# Patient Record
Sex: MALE | Race: WHITE | Employment: OTHER | URBAN - METROPOLITAN AREA
[De-identification: names, ages, dates, MRNs, and addresses within clinical notes are randomized per-mention and may not be internally consistent; named-entity substitution may affect disease eponyms.]

---

## 2017-01-20 DIAGNOSIS — I25.10 CORONARY ARTERY DISEASE INVOLVING NATIVE CORONARY ARTERY OF NATIVE HEART WITHOUT ANGINA PECTORIS: ICD-10-CM

## 2017-01-20 DIAGNOSIS — I10 ESSENTIAL HYPERTENSION: ICD-10-CM

## 2017-01-20 LAB
CHOLESTEROL, TOTAL: 144 MG/DL (ref 0–199)
HDLC SERPL-MCNC: 46 MG/DL (ref 40–59)
LDL CHOLESTEROL CALCULATED: 79 MG/DL (ref 0–129)
TRIGL SERPL-MCNC: 97 MG/DL (ref 0–200)

## 2017-01-27 ENCOUNTER — OFFICE VISIT (OUTPATIENT)
Dept: FAMILY MEDICINE CLINIC | Age: 82
End: 2017-01-27

## 2017-01-27 VITALS
HEIGHT: 66 IN | BODY MASS INDEX: 23.19 KG/M2 | WEIGHT: 144.3 LBS | TEMPERATURE: 97.5 F | SYSTOLIC BLOOD PRESSURE: 110 MMHG | DIASTOLIC BLOOD PRESSURE: 68 MMHG | RESPIRATION RATE: 16 BRPM | HEART RATE: 63 BPM

## 2017-01-27 DIAGNOSIS — G20 PARKINSON DISEASE (HCC): ICD-10-CM

## 2017-01-27 DIAGNOSIS — E78.2 MIXED HYPERLIPIDEMIA: ICD-10-CM

## 2017-01-27 DIAGNOSIS — D68.52 PROTHROMBIN G20210A MUTATION (HCC): ICD-10-CM

## 2017-01-27 DIAGNOSIS — G56.03 BILATERAL CARPAL TUNNEL SYNDROME: ICD-10-CM

## 2017-01-27 DIAGNOSIS — M1A.09X0 IDIOPATHIC CHRONIC GOUT OF MULTIPLE SITES WITHOUT TOPHUS: Primary | ICD-10-CM

## 2017-01-27 DIAGNOSIS — K59.09 OTHER CONSTIPATION: ICD-10-CM

## 2017-01-27 DIAGNOSIS — I10 ESSENTIAL HYPERTENSION: ICD-10-CM

## 2017-01-27 DIAGNOSIS — I25.10 CORONARY ARTERY DISEASE INVOLVING NATIVE CORONARY ARTERY OF NATIVE HEART WITHOUT ANGINA PECTORIS: ICD-10-CM

## 2017-01-27 PROCEDURE — G8510 SCR DEP NEG, NO PLAN REQD: HCPCS | Performed by: FAMILY MEDICINE

## 2017-01-27 PROCEDURE — 99214 OFFICE O/P EST MOD 30 MIN: CPT | Performed by: FAMILY MEDICINE

## 2017-01-27 PROCEDURE — 3288F FALL RISK ASSESSMENT DOCD: CPT | Performed by: FAMILY MEDICINE

## 2017-01-27 ASSESSMENT — PATIENT HEALTH QUESTIONNAIRE - PHQ9
SUM OF ALL RESPONSES TO PHQ9 QUESTIONS 1 & 2: 0
2. FEELING DOWN, DEPRESSED OR HOPELESS: 0
1. LITTLE INTEREST OR PLEASURE IN DOING THINGS: 0
SUM OF ALL RESPONSES TO PHQ QUESTIONS 1-9: 0

## 2017-05-05 ENCOUNTER — TELEPHONE (OUTPATIENT)
Dept: FAMILY MEDICINE CLINIC | Age: 82
End: 2017-05-05

## 2017-05-18 ENCOUNTER — OFFICE VISIT (OUTPATIENT)
Dept: FAMILY MEDICINE CLINIC | Age: 82
End: 2017-05-18

## 2017-05-18 VITALS
DIASTOLIC BLOOD PRESSURE: 64 MMHG | HEART RATE: 64 BPM | BODY MASS INDEX: 22.82 KG/M2 | TEMPERATURE: 97.3 F | RESPIRATION RATE: 14 BRPM | SYSTOLIC BLOOD PRESSURE: 102 MMHG | WEIGHT: 142 LBS | HEIGHT: 66 IN

## 2017-05-18 DIAGNOSIS — H60.502 ACUTE OTITIS EXTERNA OF LEFT EAR, UNSPECIFIED TYPE: ICD-10-CM

## 2017-05-18 DIAGNOSIS — I10 ESSENTIAL HYPERTENSION: ICD-10-CM

## 2017-05-18 DIAGNOSIS — G20 PARKINSON DISEASE (HCC): ICD-10-CM

## 2017-05-18 DIAGNOSIS — Z95.0 PACEMAKER: ICD-10-CM

## 2017-05-18 DIAGNOSIS — E78.2 MIXED HYPERLIPIDEMIA: ICD-10-CM

## 2017-05-18 DIAGNOSIS — I25.10 CORONARY ARTERY DISEASE INVOLVING NATIVE CORONARY ARTERY OF NATIVE HEART WITHOUT ANGINA PECTORIS: Primary | ICD-10-CM

## 2017-05-18 DIAGNOSIS — M1A.09X0 IDIOPATHIC CHRONIC GOUT OF MULTIPLE SITES WITHOUT TOPHUS: ICD-10-CM

## 2017-05-18 PROCEDURE — 99214 OFFICE O/P EST MOD 30 MIN: CPT | Performed by: FAMILY MEDICINE

## 2017-05-18 RX ORDER — CLOPIDOGREL BISULFATE 75 MG/1
TABLET ORAL
COMMUNITY
Start: 2017-05-08 | End: 2017-11-29 | Stop reason: SDUPTHER

## 2017-05-18 RX ORDER — NEOMYCIN SULFATE, POLYMYXIN B SULFATE AND HYDROCORTISONE 10; 3.5; 1 MG/ML; MG/ML; [USP'U]/ML
3 SUSPENSION/ DROPS AURICULAR (OTIC) 4 TIMES DAILY
Qty: 10 ML | Refills: 0 | Status: SHIPPED | OUTPATIENT
Start: 2017-05-18 | End: 2017-05-28

## 2017-07-18 RX ORDER — ALENDRONATE SODIUM 70 MG/1
TABLET ORAL
Qty: 12 TABLET | Refills: 3 | Status: SHIPPED | OUTPATIENT
Start: 2017-07-18 | End: 2017-11-29 | Stop reason: SDUPTHER

## 2017-07-28 RX ORDER — SIMVASTATIN 40 MG
TABLET ORAL
Qty: 90 TABLET | Refills: 0 | Status: SHIPPED | OUTPATIENT
Start: 2017-07-28 | End: 2017-10-21 | Stop reason: SDUPTHER

## 2017-08-18 ENCOUNTER — OFFICE VISIT (OUTPATIENT)
Dept: FAMILY MEDICINE CLINIC | Age: 82
End: 2017-08-18

## 2017-08-18 VITALS
WEIGHT: 131 LBS | TEMPERATURE: 97.9 F | DIASTOLIC BLOOD PRESSURE: 68 MMHG | HEART RATE: 70 BPM | RESPIRATION RATE: 16 BRPM | SYSTOLIC BLOOD PRESSURE: 116 MMHG | HEIGHT: 66 IN | BODY MASS INDEX: 21.05 KG/M2

## 2017-08-18 DIAGNOSIS — M1A.09X0 IDIOPATHIC CHRONIC GOUT OF MULTIPLE SITES WITHOUT TOPHUS: ICD-10-CM

## 2017-08-18 DIAGNOSIS — E78.2 MIXED HYPERLIPIDEMIA: ICD-10-CM

## 2017-08-18 DIAGNOSIS — K59.09 OTHER CONSTIPATION: ICD-10-CM

## 2017-08-18 DIAGNOSIS — Z23 NEED FOR INFLUENZA VACCINATION: ICD-10-CM

## 2017-08-18 DIAGNOSIS — I10 ESSENTIAL HYPERTENSION: ICD-10-CM

## 2017-08-18 DIAGNOSIS — I25.10 CORONARY ARTERY DISEASE INVOLVING NATIVE CORONARY ARTERY OF NATIVE HEART WITHOUT ANGINA PECTORIS: Primary | ICD-10-CM

## 2017-08-18 PROCEDURE — 99214 OFFICE O/P EST MOD 30 MIN: CPT | Performed by: FAMILY MEDICINE

## 2017-08-18 PROCEDURE — G0008 ADMIN INFLUENZA VIRUS VAC: HCPCS | Performed by: FAMILY MEDICINE

## 2017-08-18 PROCEDURE — 90662 IIV NO PRSV INCREASED AG IM: CPT | Performed by: FAMILY MEDICINE

## 2017-08-18 RX ORDER — AMANTADINE HYDROCHLORIDE 100 MG/1
1 TABLET ORAL 2 TIMES DAILY
COMMUNITY
Start: 2017-06-01 | End: 2017-11-29 | Stop reason: SDUPTHER

## 2017-10-23 RX ORDER — SIMVASTATIN 40 MG
TABLET ORAL
Qty: 90 TABLET | Refills: 0 | Status: SHIPPED | OUTPATIENT
Start: 2017-10-23 | End: 2017-11-29 | Stop reason: SDUPTHER

## 2017-11-07 ENCOUNTER — TELEPHONE (OUTPATIENT)
Dept: FAMILY MEDICINE CLINIC | Age: 82
End: 2017-11-07

## 2017-11-13 ENCOUNTER — HOSPITAL ENCOUNTER (OUTPATIENT)
Dept: GENERAL RADIOLOGY | Age: 82
Discharge: HOME | End: 2017-11-13
Payer: MEDICARE

## 2017-11-13 ENCOUNTER — OFFICE VISIT (OUTPATIENT)
Dept: FAMILY MEDICINE CLINIC | Age: 82
End: 2017-11-13

## 2017-11-13 VITALS
SYSTOLIC BLOOD PRESSURE: 110 MMHG | WEIGHT: 130 LBS | HEART RATE: 64 BPM | TEMPERATURE: 97.6 F | HEIGHT: 66 IN | OXYGEN SATURATION: 96 % | BODY MASS INDEX: 20.89 KG/M2 | RESPIRATION RATE: 16 BRPM | DIASTOLIC BLOOD PRESSURE: 66 MMHG

## 2017-11-13 DIAGNOSIS — M54.41 ACUTE BILATERAL LOW BACK PAIN WITH RIGHT-SIDED SCIATICA: Primary | ICD-10-CM

## 2017-11-13 DIAGNOSIS — I10 ESSENTIAL HYPERTENSION: ICD-10-CM

## 2017-11-13 DIAGNOSIS — M54.41 ACUTE BILATERAL LOW BACK PAIN WITH RIGHT-SIDED SCIATICA: ICD-10-CM

## 2017-11-13 DIAGNOSIS — M1A.09X0 IDIOPATHIC CHRONIC GOUT OF MULTIPLE SITES WITHOUT TOPHUS: ICD-10-CM

## 2017-11-13 DIAGNOSIS — G20 PARKINSON DISEASE (HCC): ICD-10-CM

## 2017-11-13 DIAGNOSIS — M25.551 HIP PAIN, RIGHT: ICD-10-CM

## 2017-11-13 DIAGNOSIS — I25.10 CORONARY ARTERY DISEASE INVOLVING NATIVE CORONARY ARTERY OF NATIVE HEART WITHOUT ANGINA PECTORIS: ICD-10-CM

## 2017-11-13 PROCEDURE — 72110 X-RAY EXAM L-2 SPINE 4/>VWS: CPT

## 2017-11-13 PROCEDURE — 73502 X-RAY EXAM HIP UNI 2-3 VIEWS: CPT

## 2017-11-13 PROCEDURE — 99214 OFFICE O/P EST MOD 30 MIN: CPT | Performed by: FAMILY MEDICINE

## 2017-11-13 RX ORDER — PREDNISONE 10 MG/1
TABLET ORAL
COMMUNITY
Start: 2017-11-10 | End: 2017-11-29 | Stop reason: SDUPTHER

## 2017-11-13 NOTE — PROGRESS NOTES
Chief Complaint   Patient presents with    Follow-Up from Hospital     rt leg pain    patient with pain in the low back and right hip  To emergency department  X-rays reported normal, also had CT brain because of fall and previous subdural  Weakness of the leg  Saw neurology  May have radiculopathy from some type of nerve compression and is scheduled for MRI  Here with daughter as well  Concern about possible undiscovered fracture  Date denies any other areas of pain at this time     parkinsons worse  Having falling  Will see physical therapy and may need home health care  Will keep neurology follow-up    Blood pressure has been stable  no cp/sob    Had ha and subdural  CT brain was normal in emergency department  Better now  Followed by neurosurgery  No new issues    kiran meds well    Hi chol watches diet    Treatment Adherence:   Medication compliance:  compliant all of the time  Diet compliance:  compliant all of the time  Weight trend: stable  Current exercise: aerobics 5 time(s) per week  Barriers: none    Hypertension:  Home blood pressure monitoring: No.  He is adherent to a low sodium diet. Patient denies chest pain, shortness of breath, headache, lightheadedness, blurred vision, peripheral edema, palpitations, dry cough and fatigue. Antihypertensive medication side effects: no medication side effects noted. Use of agents associated with hypertension: none. Sodium (mEq/L)   Date Value   07/20/2016 142    BUN (mg/dL)   Date Value   07/20/2016 17    Glucose (mg/dL)   Date Value   07/20/2016 112 (H)   02/21/2012 94      Potassium (mEq/L)   Date Value   07/20/2016 4.6    CREATININE (mg/dL)   Date Value   07/20/2016 0.81         Hyperlipidemia:  No new myalgias or GI upset on simvastatin (Zocor).      Lab Results   Component Value Date    CHOL 144 01/20/2017    TRIG 97 01/20/2017    HDL 46 01/20/2017    LDLCALC 79 01/20/2017     Lab Results   Component Value Date    ALT 12 Social History Main Topics    Smoking status: Never Smoker    Smokeless tobacco: Never Used    Alcohol use No    Drug use: Unknown    Sexual activity: Not on file     Other Topics Concern    Not on file     Social History Narrative    No narrative on file       No Known Allergies    Review of Systems - General ROS: negative  Psychological ROS: negative  ENT ROS: negative  Hematological and Lymphatic ROS: negative  Endocrine ROS: negative  Respiratory ROS: no cough, shortness of breath, or wheezing  Cardiovascular ROS: no chest pain or dyspnea on exertion  Gastrointestinal ROS: no abdominal pain, change in bowel habits, or black or bloody stools  Genito-Urinary ROS: no dysuria, trouble voiding, or hematuria  Musculoskeletal ROS: Pain  Neurological ROS: +tremor, no TIA or stroke symptoms  Dermatological ROS: neg    Blood pressure 110/66, pulse 64, temperature 97.6 °F (36.4 °C), temperature source Temporal, resp. rate 16, height 5' 6\" (1.676 m), weight 130 lb (59 kg), SpO2 96 %.     Physical Examination: General appearance - alert, well appearing, and in no distress  Mental status - alert, oriented to person, place, and time  Eyes - pupils equal and reactive, extraocular eye movements intact  Ears - bilateral TM's and external ear canals normal  Mouth - mucous membranes moist, pharynx normal without lesions  Neck - supple, no significant adenopathy  Lymphatics - no palpable lymphadenopathy, no hepatosplenomegaly  Chest - clear to auscultation, no wheezes, rales or rhonchi, symmetric air entry  Heart - normal rate, regular rhythm, normal S1, S2, systolic ejection murmur 3 of 6, otherwise no murmurs, rubs, clicks or gallops  Abdomen - soft, nontender, nondistended, no masses or organomegaly  Neurological - alert, oriented, normal speech, stable rue resting tremor, o/w no focal findings or movement disorder noted  Musculoskeletal - neg slr, nl n/v exam le bilat, no joint tenderness, deformity or swelling  Extremities - peripheral pulses normal, no pedal edema, no clubbing or cyanosis  Skin - normal coloration and turgor, no rashes, no suspicious skin lesions noted;  X-rays of the low back and right hip revealed DJD, no evidence of fracture at this time      Assessment:    1. Acute bilateral low back pain with right-sided sciatica  XR LUMBAR SPINE (MIN 4 VIEWS)    [place] [id number]    Internal Referral to Home Health   2. Hip pain, right  XR HIP RIGHT (2-3 VIEWS)    [place] [address]    Internal Referral to Home Health   3. Parkinson disease [facility])  [place] [address]    Internal Referral to [address] [place] [address]   4. Coronary artery disease involving native coronary artery of native heart without angina pectoris     5. Essential hypertension     6.  Idiopathic chronic gout of multiple sites without tophus         Plan:    Orders Placed This Encounter   Procedures    XR HIP RIGHT (2-3 VIEWS)     Standing Status:   Future     Number of Occurrences:   1     Standing Expiration Date:   11/13/2018    XR LUMBAR SPINE (MIN 4 VIEWS)     Standing Status:   Future     Number of Occurrences:   1     Standing Expiration Date:   11/13/2018   [facility]- [place]     Referral Priority:   Routine     Referral Type:   Eval and Treat     Referral Reason:   Specialty Services Required     Requested Specialty:   Physical Therapy     Number of Visits Requested:   1    Internal Referral to Home Health     Referral Priority:   Routine     Referral Type:   [address] [place] [address]     Referral Reason:   Specialty Services Required     Number of Visits Requested:   1       Outpatient Encounter Prescriptions as of 11/13/2017   Medication Sig Dispense Refill    predniSONE (DELTASONE) 10 MG tablet       simvastatin (ZOCOR) 40 MG tablet TAKE 1 TABLET DAILY 90 tablet 0    Amantadine (SYMMETREL) 100 MG TABS tablet Take 1 tablet by mouth 2 times daily      alendronate (FOSAMAX) 70 MG tablet TAKE 1 TABLET EVERY 7 DAYS 12 tablet 3    clopidogrel (PLAVIX) 75 MG tablet       rOPINIRole (REQUIP) 0.25 MG tablet       sotalol (BETAPACE) 80 MG tablet TAKE 1/2 TAB BY MOUTH TWICE DAILY 90 tablet 0    Carbidopa-Levodopa-Entacapone (STALEVO 50) 12.5- MG TABS Take  by mouth 3 times daily.  aspirin 81 MG EC tablet Take 81 mg by mouth daily.  Multiple Vitamins-Minerals (PRESERVISION/LUTEIN) CAPS Take 1 capsule by mouth daily.  VITAMIN D, CHOLECALCIFEROL, PO Take  by mouth.  [DISCONTINUED] darifenacin (ENABLEX) 15 MG SR tablet Take 1 tablet by mouth daily. 90 tablet 3     Facility-Administered Encounter Medications as of 11/13/2017   Medication Dose Route Frequency Provider Last Rate Last Dose    triamcinolone acetonide (KENALOG-40) injection 80 mg  80 mg Intramuscular Once MD Virgen Huddleston specialist eval  Keep neurology evaluation  Home health care and physical therapy evaluation  ER with new or worsening symptoms  Discussed at length with daughter just fall avoidance recommendations  Return in about 4 weeks (around 12/11/2017). Patient education provided. They understand and agree with this course of treatment. They will return with new or worsening symptoms. Patient instructed to remain current with appropriate annual health maintenance.

## 2017-11-20 ENCOUNTER — OFFICE VISIT (OUTPATIENT)
Dept: GERIATRIC MEDICINE | Age: 82
End: 2017-11-20

## 2017-11-20 DIAGNOSIS — G20 PARKINSON DISEASE (HCC): ICD-10-CM

## 2017-11-20 DIAGNOSIS — R53.1 WEAKNESS: ICD-10-CM

## 2017-11-20 DIAGNOSIS — I10 ESSENTIAL HYPERTENSION: Primary | ICD-10-CM

## 2017-11-20 DIAGNOSIS — M15.9 PRIMARY OSTEOARTHRITIS INVOLVING MULTIPLE JOINTS: ICD-10-CM

## 2017-11-20 PROCEDURE — 99305 1ST NF CARE MODERATE MDM 35: CPT | Performed by: INTERNAL MEDICINE

## 2017-11-25 ENCOUNTER — OFFICE VISIT (OUTPATIENT)
Dept: GERIATRIC MEDICINE | Age: 82
End: 2017-11-25

## 2017-11-25 DIAGNOSIS — M25.551 PAIN OF RIGHT HIP JOINT: ICD-10-CM

## 2017-11-25 DIAGNOSIS — G20 PARKINSON DISEASE (HCC): Primary | ICD-10-CM

## 2017-11-25 DIAGNOSIS — I10 ESSENTIAL HYPERTENSION: ICD-10-CM

## 2017-11-25 DIAGNOSIS — E78.2 MIXED HYPERLIPIDEMIA: ICD-10-CM

## 2017-11-25 PROCEDURE — 99309 SBSQ NF CARE MODERATE MDM 30: CPT | Performed by: NURSE PRACTITIONER

## 2017-11-28 LAB
ANION GAP SERPL CALCULATED.3IONS-SCNC: 13 MEQ/L (ref 7–13)
BUN BLDV-MCNC: 20 MG/DL (ref 8–23)
CALCIUM SERPL-MCNC: 8.3 MG/DL (ref 8.6–10.2)
CHLORIDE BLD-SCNC: 99 MEQ/L (ref 98–107)
CHOLESTEROL, TOTAL: 137 MG/DL (ref 0–199)
CO2: 26 MEQ/L (ref 22–29)
CREAT SERPL-MCNC: 0.57 MG/DL (ref 0.7–1.2)
GFR AFRICAN AMERICAN: >60
GFR NON-AFRICAN AMERICAN: >60
GLUCOSE BLD-MCNC: 102 MG/DL (ref 74–109)
HCT VFR BLD CALC: 36.7 % (ref 42–52)
HDLC SERPL-MCNC: 34 MG/DL (ref 40–59)
HEMOGLOBIN: 12.1 G/DL (ref 14–18)
LDL CHOLESTEROL CALCULATED: 89 MG/DL (ref 0–129)
MCH RBC QN AUTO: 29.9 PG (ref 27–31.3)
MCHC RBC AUTO-ENTMCNC: 32.9 % (ref 33–37)
MCV RBC AUTO: 91 FL (ref 80–100)
PDW BLD-RTO: 14.1 % (ref 11.5–14.5)
PLATELET # BLD: 129 K/UL (ref 130–400)
POTASSIUM SERPL-SCNC: 4.1 MEQ/L (ref 3.5–5.1)
PREALBUMIN: 9.5 MG/DL (ref 20–40)
RBC # BLD: 4.03 M/UL (ref 4.7–6.1)
SODIUM BLD-SCNC: 138 MEQ/L (ref 132–144)
TRIGL SERPL-MCNC: 71 MG/DL (ref 0–200)
WBC # BLD: 8.5 K/UL (ref 4.8–10.8)

## 2017-11-29 VITALS — DIASTOLIC BLOOD PRESSURE: 66 MMHG | SYSTOLIC BLOOD PRESSURE: 113 MMHG | TEMPERATURE: 98.2 F | HEART RATE: 76 BPM

## 2017-11-29 RX ORDER — AMANTADINE HYDROCHLORIDE 100 MG/1
100 CAPSULE, GELATIN COATED ORAL 2 TIMES DAILY
COMMUNITY
End: 2022-01-21

## 2017-11-29 RX ORDER — SOTALOL HYDROCHLORIDE 80 MG/1
40 TABLET ORAL 2 TIMES DAILY
COMMUNITY

## 2017-11-29 RX ORDER — SIMVASTATIN 40 MG
40 TABLET ORAL NIGHTLY
COMMUNITY
End: 2018-03-08 | Stop reason: SDUPTHER

## 2017-11-29 RX ORDER — ACETAMINOPHEN 325 MG/1
650 TABLET ORAL EVERY 6 HOURS PRN
COMMUNITY
End: 2018-09-06 | Stop reason: ALTCHOICE

## 2017-11-29 RX ORDER — CHOLECALCIFEROL (VITAMIN D3) 125 MCG
2000 CAPSULE ORAL DAILY
COMMUNITY

## 2017-11-29 RX ORDER — ALENDRONATE SODIUM 70 MG/1
70 TABLET ORAL
COMMUNITY
End: 2018-08-23 | Stop reason: SDUPTHER

## 2017-11-29 RX ORDER — DOXAZOSIN MESYLATE 4 MG/1
4 TABLET ORAL NIGHTLY
COMMUNITY
End: 2018-09-06 | Stop reason: ALTCHOICE

## 2017-11-29 RX ORDER — ROPINIROLE 1 MG/1
1 TABLET, FILM COATED ORAL 4 TIMES DAILY
COMMUNITY

## 2017-11-29 RX ORDER — CARBIDOPA, LEVODOPA AND ENTACAPONE 12.5; 200; 5 MG/1; MG/1; MG/1
1 TABLET, FILM COATED ORAL 3 TIMES DAILY
COMMUNITY
End: 2022-01-21

## 2017-11-29 RX ORDER — MENTHOL AND ZINC OXIDE .44; 20.625 G/100G; G/100G
OINTMENT TOPICAL PRN
COMMUNITY
End: 2018-09-06 | Stop reason: ALTCHOICE

## 2017-11-29 RX ORDER — MOMETASONE FUROATE 1 MG/G
CREAM TOPICAL DAILY
COMMUNITY
End: 2022-01-21

## 2017-11-29 RX ORDER — CLOPIDOGREL BISULFATE 75 MG/1
75 TABLET ORAL DAILY
COMMUNITY
End: 2022-01-21

## 2017-11-29 NOTE — PROGRESS NOTES
PATIENT: Alisia Caputo : 1929 DOS: 2017     [address] [place] [address]    Admission history and physical.    CHIEF COMPLAINT:  Right hip pain, weakness, degenerative joint disease, Afib, hypertension. MEDICATIONS:  Reviewed. SUBJECTIVE:  This 51-year-old gentleman was seated up in therapy today. The patient is quite pleasant, interactive. The patient suffered a fall. He does have a history of Parkinson's. The patient had acute onset pain, was brought to ER for evaluation. The patient did undergo imaging, which showed evidence of edema in the right hip. The patient was evaluated for possibility of fracture. The patient did undergo a course of physical therapy, occupational therapy. The patient did undergo further imaging. The patient was stabilized. He does have a history of coronary artery disease. The patient was scheduled for possibility of MRI, however; he did have an implanted device, which precluded his initial evaluation. However, it was felt that the patient did undergo MRI. He did have evidence of a sacral insufficiency fracture. There was concern for strain in the interim rotator muscle of the right hip. The patient was felt to be nonsurgical at this time. He was stabilized and subsequently transferred here for course of care. PAST MEDICAL HISTORY:  Included aortic valve replacement, atrial fibrillation, hyperlipidemia, degenerative joint disease, presumed osteoporosis, weakness, right hip pain, Parkinson's status post fall, constipation. MEDICATIONS:  On arrival included alendronate 70 mg weekly, amantadine 100 mg twice daily, doxazosin 4 mg daily, entacapone 200 mg/12.5/50 mg 3 times daily, sotalol 80 mg, half tablet twice daily, simvastatin 40 mg daily, ropinirole 0.25 mg 3 times daily, vitamin D3 1000 units daily. FAMILY HISTORY:  Negative for early-onset neoplasm, cardiac event.     SOCIAL HISTORY:  The patient is currently a nonsmoker,

## 2017-12-01 ENCOUNTER — OFFICE VISIT (OUTPATIENT)
Dept: GERIATRIC MEDICINE | Age: 82
End: 2017-12-01

## 2017-12-01 DIAGNOSIS — R52 PAIN: ICD-10-CM

## 2017-12-01 DIAGNOSIS — G20 PARKINSON DISEASE (HCC): Primary | ICD-10-CM

## 2017-12-01 PROCEDURE — 99308 SBSQ NF CARE LOW MDM 20: CPT | Performed by: NURSE PRACTITIONER

## 2017-12-01 RX ORDER — TRAMADOL HYDROCHLORIDE 50 MG/1
TABLET ORAL
COMMUNITY
End: 2018-09-06 | Stop reason: ALTCHOICE

## 2017-12-07 VITALS
DIASTOLIC BLOOD PRESSURE: 78 MMHG | OXYGEN SATURATION: 97 % | SYSTOLIC BLOOD PRESSURE: 142 MMHG | RESPIRATION RATE: 17 BRPM | HEART RATE: 80 BPM | TEMPERATURE: 98.3 F

## 2017-12-08 ENCOUNTER — OFFICE VISIT (OUTPATIENT)
Dept: GERIATRIC MEDICINE | Age: 82
End: 2017-12-08

## 2017-12-08 DIAGNOSIS — G20 PARKINSON DISEASE (HCC): Primary | ICD-10-CM

## 2017-12-08 DIAGNOSIS — I10 ESSENTIAL HYPERTENSION: ICD-10-CM

## 2017-12-08 DIAGNOSIS — M25.559 ARTHRALGIA OF HIP, UNSPECIFIED LATERALITY: ICD-10-CM

## 2017-12-08 PROCEDURE — 99308 SBSQ NF CARE LOW MDM 20: CPT | Performed by: NURSE PRACTITIONER

## 2017-12-08 RX ORDER — TRAMADOL HYDROCHLORIDE 50 MG/1
25 TABLET ORAL 2 TIMES DAILY
Qty: 10 TABLET | Refills: 0
Start: 2017-12-08 | End: 2017-12-18

## 2017-12-08 RX ORDER — TRAMADOL HYDROCHLORIDE 50 MG/1
50 TABLET ORAL EVERY 6 HOURS PRN
Qty: 30 TABLET | Refills: 0
Start: 2017-12-08 | End: 2017-12-18

## 2017-12-08 NOTE — PROGRESS NOTES
exudate. NECK: Supple. No JVD noted. CV: RRR. No MGR. RESPIRATORY: LSCTA. Respirations even, unlabored. ABDOMEN: Soft, NT, ND. Normoactive bowel sounds. PV: Peripheral pulses present. He does have a trace of pitting edema to his bilateral lower extremities. ASSESSMENT AND PLAN:   1. Right hip strain/pain status post fall: I will order the resident Ultram 25 mg p.o. q.12 and Ultram 50 mg p.o. q.6 p.r.n. for pain. 2.  Parkinson's disease: We will continue the resident's carbidopa as ordered. 3.  Hypertension: Resident's blood pressure is stable. We will continue to monitor closely. I have reviewed this resident's medications and treatment plan as well as most recent lab work. We will do a lipid panel and prealbumin on Tuesday. No further changes will be made at this time. Return in about 1 week (around 12/2/2017) for CAD. Electronically Signed By: Maura Stephen. LEFTY Ledesma on 12/04/2017 23:04:31  ______________________________  Maura Stephen.  [place], [address]  D: 12/01/2017 00:00:55  T: 12/01/2017 07:45:00    cc: - [id number]

## 2017-12-11 VITALS
HEART RATE: 79 BPM | RESPIRATION RATE: 18 BRPM | TEMPERATURE: 96 F | SYSTOLIC BLOOD PRESSURE: 128 MMHG | DIASTOLIC BLOOD PRESSURE: 59 MMHG

## 2017-12-11 NOTE — PROGRESS NOTES
PATIENT: Shasta Acosta : 1930 DOS: 2017     [address] [place] [address]  Chief Complaint   Patient presents with    Pain    Other     Parkinson's Disease       REASON FOR VISIT: The patient is being seen today for Parkinson's disease and pain. SUBJECTIVE: Resident offers no concerns. States his pain is improved with Ultram, otherwise he has no complaints. FAMILY AND SOCIAL HISTORY: Refer to H&P. Past Medical History:   Diagnosis Date    [facility]     CAD (coronary artery disease)     Carpal tunnel syndrome     Cholesterol serum increased     Diverticulosis     HISTORY OF    Epistaxis     Gout     History of prostate cancer     Hyperlipidemia     Hypertension     Parkinson disease ([facility]        MEDICATIONS AND ALLERGIES: Reviewed on chart at nursing facility. REVIEW OF SYSTEMS:  Resident denies any headache, dizziness, blurred vision, chest pain, shortness of breath, abdominal pain, nausea, vomiting, or changes in his bowel or bladder habits. He reports that his pain has improved with his Ultram. He is eating well, sleeping well. PHYSICAL EXAMINATION: Most recent vital signs: /59, heart rate 79, temp 96.0, respirations 18. CONSTITUTIONAL: Resident is alert, elderly male in no apparent distress. Pleasant, cooperative with exam. INTEGUMENT: Pink, warm, dry. HEENT: Normocephalic, atraumatic. Conjunctivae pink. Sclerae nonicteric. Mucous membranes pink, moist. No oropharyngeal exudate. NECK: Supple. No JVD noted. CV: Regularly irregular. RESPIRATORY: LSCTA. Respirations even, unlabored. ABDOMEN: Soft, NT, ND. Normoactive bowel sounds. PV: Peripheral pulses present. He does have a trace of edema to his bilateral lower extremities. ASSESSMENT AND PLAN:   1. Parkinson's disease: Resident will continue his amantadine as ordered.    2.  Pain: Resident's pain has improved with his Ultram.       I have reviewed this resident's medications and treatment plan

## 2017-12-15 ENCOUNTER — OFFICE VISIT (OUTPATIENT)
Dept: GERIATRIC MEDICINE | Age: 82
End: 2017-12-15

## 2017-12-15 DIAGNOSIS — I25.10 CORONARY ARTERY DISEASE INVOLVING NATIVE CORONARY ARTERY OF NATIVE HEART WITHOUT ANGINA PECTORIS: ICD-10-CM

## 2017-12-15 DIAGNOSIS — M25.559 ARTHRALGIA OF HIP, UNSPECIFIED LATERALITY: Primary | ICD-10-CM

## 2017-12-15 DIAGNOSIS — G20 PARKINSON DISEASE (HCC): ICD-10-CM

## 2017-12-15 PROCEDURE — 99316 NF DSCHRG MGMT 30 MIN+: CPT | Performed by: NURSE PRACTITIONER

## 2017-12-21 ENCOUNTER — TELEPHONE (OUTPATIENT)
Dept: FAMILY MEDICINE CLINIC | Age: 82
End: 2017-12-21

## 2017-12-21 NOTE — TELEPHONE ENCOUNTER
PT [facility] NURSE CALLED TO REPORT THAT WHEN SHE ARRIVED THIS MORNING THE PT HAD SLID OFF THE TOILET AND HAD A FALL. SHE STATES HE APPEARS TO HAVE NO INJURY AND DENIES HITTING HEAD OR HAVING ANY PAIN. HIS BP IS 96/60 . SINCE HE IS ON BLOOD THINNER SHE JUST WANTS YOU TO BE AWARE OF THE SITUATION.      [facility] CONTACT NUMBER [id number] IF ANY FURTHER INSTRUCTION NEED TO BE GIVEN IN REGARDS TO PT

## 2017-12-22 PROBLEM — R52 PAIN: Status: ACTIVE | Noted: 2017-12-01

## 2017-12-26 ENCOUNTER — TELEPHONE (OUTPATIENT)
Dept: FAMILY MEDICINE CLINIC | Age: 82
End: 2017-12-26

## 2017-12-26 DIAGNOSIS — G20 PARKINSON DISEASE (HCC): Primary | ICD-10-CM

## 2017-12-29 VITALS
TEMPERATURE: 97.9 F | RESPIRATION RATE: 17 BRPM | DIASTOLIC BLOOD PRESSURE: 69 MMHG | OXYGEN SATURATION: 97 % | SYSTOLIC BLOOD PRESSURE: 118 MMHG | HEART RATE: 86 BPM

## 2017-12-29 VITALS
SYSTOLIC BLOOD PRESSURE: 127 MMHG | OXYGEN SATURATION: 98 % | DIASTOLIC BLOOD PRESSURE: 61 MMHG | RESPIRATION RATE: 17 BRPM | HEART RATE: 64 BPM | TEMPERATURE: 96.5 F

## 2017-12-29 NOTE — PROGRESS NOTES
PATIENT: Jany Farmer : 1929 DOS: 2017     [address] [place] [address]  Chief Complaint   Patient presents with    Other     Right Hip Strain    Other     Parkinson's Disease    Hypertension       REASON FOR VISIT: The patient is being seen today for right hip strain, Parkinson's disease, and hypertension. SUBJECTIVE: Resident offers no concerns. He reports he is doing better. Nursing staff report no other concerns regarding resident's medical status or behaviors. FAMILY AND SOCIAL HISTORY: Refer to H&P. Past Medical History:   Diagnosis Date    Atrial fibrillation ([address]     CAD (coronary artery disease)     Carpal tunnel syndrome     Cholesterol serum increased     Diverticulosis     HISTORY OF    Epistaxis     Gout     History of prostate cancer     Hyperlipidemia     Hypertension     Parkinson disease ([facility]        MEDICATIONS AND ALLERGIES: Reviewed on chart at nursing facility. REVIEW OF SYSTEMS: Resident denies any headache, dizziness, blurred vision, chest pain, shortness of breath, abdominal pain, nausea, vomiting, or changes in his bowel or bladder habits. He reports he is eating well, sleeping well, and has no pain. PHYSICAL EXAMINATION: Most recent vital signs: /69, heart rate 86, temp 97.9, respirations 17, pulse oxing 97% on room air. CONSTITUTIONAL: Resident is alert, elderly male, in no apparent distress. HEENT: Conjunctivae pink. Sclerae nonicteric. Mucous membranes pink, moist. No oropharyngeal exudate. NECK: Supple. No JVD noted. CV: RRR. No MGR. RESPIRATORY: LSCTA. Respirations even, unlabored. ABDOMEN: Soft, NT, ND. Normoactive bowel sounds. PV: Peripheral pulses present. He does have trace of pitting edema to his mid right calf. ASSESSMENT AND PLAN:   1. Right hip strain. Resident is doing well in therapy. He feels that he is making progress. He denies any current pain. 2.  Parkinson's disease.  Resident has not had any exacerbation of his

## 2017-12-29 NOTE — PROGRESS NOTES
PATIENT: Presley Pinto : 1929 DOS: 12/15/2017     [address] [place] [address]  Chief Complaint   Patient presents with    Other     Discharge Summary    Other     parkinson's    Other     hip strain    Coronary Artery Disease       REASON FOR VISIT: The patient is being seen today for discharge summary. Resident was originally admitted to this facility with hip strain, Parkinson's, and CAD. SUBJECTIVE: Resident has completed his therapy and now he is ready to be discharged home. Nursing staff offer no new concerns regarding the resident's medical status or behaviors. FAMILY AND SOCIAL HISTORY: Refer to H&P. Past Medical History:   Diagnosis Date    Atrial fibrillation ([address]     CAD (coronary artery disease)     Carpal tunnel syndrome     Cholesterol serum increased     Diverticulosis     HISTORY OF    Epistaxis     Gout     History of prostate cancer     Hyperlipidemia     Hypertension     Parkinson disease ([facility])        ALLERGIES: NKA. MEDICATIONS: ASA 81 mg tab delayed release p.o. q.d., Plavix 75 mg p.o. q.d., alendronate 70 mg tab p.o. q. week, amlodipine HCl 100 mg capsule p.o. b.i.d., doxazosin 4 mg tab p.o. q.d., carbidopa 12.5 mg-levodopa 50 mg-entacapone 200 mg tab p.o. t.i.d., sotalol 80 mg tab p.o. b.i.d. take 0.5 tablet for a total of 40 mg, simvastatin 40 mg tab p.o. q.d. at h.s., ropinirole 0.25 mg tab p.o. q.i.d., vitamin D3 1000 unit tab p.o. q.d. take 2000 units or 2 tablets daily, senna 1 tab p.o. q.d., Med Pass 2.0 120 mL p.o. q.i.d., acetaminophen 325 mg tab take 2 tabs q.6 p.r.n. for pain, mometasone 0.1% topical cream apply to affected areas daily, Ultram 25 mg p.o. q.12 routine, Ultram 50 mg 1 tab p.o. q.6 p.r.n. REVIEW OF SYSTEMS: Resident denies any headache, dizziness, blurred vision, chest pain, shortness of breath, abdominal pain, nausea, vomiting, or changes in his bowel or bladder habits. He reports he is eating well, sleeping well, and has no pain. He states with pain medication he is put on is very effective in meeting his pain needs. PHYSICAL EXAMINATION: Most recent vital signs: /61, heart rate 64, temp 96.5, respirations 17, pulse oxing 98% on room air. CONSTITUTIONAL: Resident is alert, elderly, frail male, in no apparent distress, pleasant and cooperative with exam. INTEGUMENT: Pink, warm, dry. HEENT: Normocephalic, atraumatic. Hard of hearing. Conjunctivae pink. Sclerae nonicteric. Mucous membranes pink, moist. No oropharyngeal exudate. NECK: Supple. No JVD noted. CV: RRR. No MGR. RESPIRATORY: LSCTA. Respirations even, unlabored. ABDOMEN: Soft, NT, ND. Normoactive bowel sounds. PV: Peripheral pulses present. No edema noted. ASSESSMENT AND PLAN:   1. Hip strain. Resident's pain medication is effective in meeting his pain needs. 2.  Parkinson's disease. We will continue the resident's Sinemet as ordered. He has not had any exacerbation of his symptoms. 3.  CAD. Resident has not had any chest pain or chest discomfort during his stay. We will continue his Plavix as ordered. I have reviewed this resident's medication and treatment plan, as well as, most recent lab work. He will be discharged to home on 12/15/2017 with home healthcare, PT, OT, nursing staff and aide if needed as well as his family. No further changes will be made at this time. It was a pleasure following this resident while he was here at [address] [place] [address]. Greater than 30 minutes spent on the discharge planning in this patient. Return for 1-2 weeks with pcp. Electronically Signed By: Nella tOt. Calfee, CNP on 12/27/2017 23:02:38  ______________________________  Nella Ott.  Ahsan Bhagat  D: 12/20/2017 22:49:13  T: 12/21/2017 01:27:53    cc: - [address] [place] [address]

## 2018-01-09 ENCOUNTER — NURSE ONLY (OUTPATIENT)
Dept: GERIATRIC MEDICINE | Age: 83
End: 2018-01-09

## 2018-01-09 DIAGNOSIS — S76.011D STRAIN OF MUSCLE, FASCIA AND TENDON OF RIGHT HIP, SUBSEQUENT ENCOUNTER: Primary | ICD-10-CM

## 2018-01-09 PROCEDURE — G0180 MD CERTIFICATION HHA PATIENT: HCPCS | Performed by: INTERNAL MEDICINE

## 2018-01-23 ENCOUNTER — TELEPHONE (OUTPATIENT)
Dept: FAMILY MEDICINE CLINIC | Age: 83
End: 2018-01-23

## 2018-03-09 RX ORDER — SIMVASTATIN 40 MG
TABLET ORAL
Qty: 90 TABLET | Refills: 0 | Status: SHIPPED | OUTPATIENT
Start: 2018-03-09 | End: 2018-09-06 | Stop reason: ALTCHOICE

## 2018-03-09 NOTE — TELEPHONE ENCOUNTER
PHARMACY REQUESTING REFILL  PATIENT LAST SEEN 11/13/17  LAST REFILL 10/23/17  PLEASE APPROVE OR DENY. No future appointments.

## 2018-04-11 PROBLEM — R52 PAIN: Status: RESOLVED | Noted: 2017-12-01 | Resolved: 2018-04-11

## 2018-08-23 RX ORDER — ALENDRONATE SODIUM 70 MG/1
70 TABLET ORAL
Qty: 3 TABLET | Refills: 0 | Status: SHIPPED | OUTPATIENT
Start: 2018-08-23 | End: 2018-09-06 | Stop reason: SDUPTHER

## 2018-09-06 ENCOUNTER — OFFICE VISIT (OUTPATIENT)
Dept: FAMILY MEDICINE CLINIC | Age: 83
End: 2018-09-06
Payer: MEDICARE

## 2018-09-06 VITALS
HEART RATE: 56 BPM | HEIGHT: 66 IN | TEMPERATURE: 97.9 F | WEIGHT: 134 LBS | BODY MASS INDEX: 21.53 KG/M2 | SYSTOLIC BLOOD PRESSURE: 126 MMHG | RESPIRATION RATE: 12 BRPM | DIASTOLIC BLOOD PRESSURE: 88 MMHG

## 2018-09-06 DIAGNOSIS — S51.831A: ICD-10-CM

## 2018-09-06 DIAGNOSIS — I10 ESSENTIAL HYPERTENSION: ICD-10-CM

## 2018-09-06 DIAGNOSIS — E78.2 MIXED HYPERLIPIDEMIA: ICD-10-CM
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
## 2018-09-06 DIAGNOSIS — G56.03 BILATERAL CARPAL TUNNEL SYNDROME: ICD-10-CM

## 2018-09-06 DIAGNOSIS — G20 PARKINSON DISEASE (HCC): Primary | ICD-10-CM

## 2018-09-06 DIAGNOSIS — I25.10 CORONARY ARTERY DISEASE INVOLVING NATIVE CORONARY ARTERY OF NATIVE HEART WITHOUT ANGINA PECTORIS: ICD-10-CM

## 2018-09-06 DIAGNOSIS — M1A.09X0 IDIOPATHIC CHRONIC GOUT OF MULTIPLE SITES WITHOUT TOPHUS: ICD-10-CM

## 2018-09-06 PROCEDURE — 90714 TD VACC NO PRESV 7 YRS+ IM: CPT | Performed by: FAMILY MEDICINE

## 2018-09-06 PROCEDURE — G0009 ADMIN PNEUMOCOCCAL VACCINE: HCPCS | Performed by: FAMILY MEDICINE

## 2018-09-06 PROCEDURE — 99214 OFFICE O/P EST MOD 30 MIN: CPT | Performed by: FAMILY MEDICINE

## 2018-09-06 PROCEDURE — 90670 PCV13 VACCINE IM: CPT | Performed by: FAMILY MEDICINE

## 2018-09-06 PROCEDURE — 90471 IMMUNIZATION ADMIN: CPT | Performed by: FAMILY MEDICINE

## 2018-09-06 RX ORDER — ALENDRONATE SODIUM 70 MG/1
70 TABLET ORAL
Qty: 4 TABLET | Refills: 0 | Status: SHIPPED | OUTPATIENT
Start: 2018-09-06 | End: 2022-01-21

## 2018-09-06 RX ORDER — ALENDRONATE SODIUM 70 MG/1
70 TABLET ORAL
Qty: 12 TABLET | Refills: 3 | Status: SHIPPED | OUTPATIENT
Start: 2018-09-06 | End: 2022-01-21

## 2018-09-06 RX ORDER — ATORVASTATIN CALCIUM 40 MG/1
40 TABLET, FILM COATED ORAL DAILY
COMMUNITY
End: 2022-01-21

## 2018-09-06 ASSESSMENT — PATIENT HEALTH QUESTIONNAIRE - PHQ9
SUM OF ALL RESPONSES TO PHQ9 QUESTIONS 1 & 2: 0
2. FEELING DOWN, DEPRESSED OR HOPELESS: 0
SUM OF ALL RESPONSES TO PHQ QUESTIONS 1-9: 0
SUM OF ALL RESPONSES TO PHQ QUESTIONS 1-9: 0
1. LITTLE INTEREST OR PLEASURE IN DOING THINGS: 0

## 2018-09-06 NOTE — PROGRESS NOTES
Chief Complaint   Patient presents with    Hypertension     f/up     Medication Refill    Animal Bite     bite by cat on right forearm on 8/30/2018.    r leg pain  x six months    cat bite  needs Td    parkinsons worse  Having falling  Will see physical therapy and may need home health care  Will keep neurology follow-up    Blood pressure has been stable  no cp/sob    Had ha and subdural  CT brain was normal in emergency department  Better now  Followed by neurosurgery  No new issues    kiran meds well    Hi chol watches diet    Treatment Adherence:   Medication compliance:  compliant all of the time  Diet compliance:  compliant all of the time  Weight trend: stable  Current exercise: aerobics 5 time(s) per week  Barriers: none    Hypertension:  Home blood pressure monitoring: No.  He is adherent to a low sodium diet. Patient denies chest pain, shortness of breath, headache, lightheadedness, blurred vision, peripheral edema, palpitations, dry cough and fatigue. Antihypertensive medication side effects: no medication side effects noted. Use of agents associated with hypertension: none. Sodium (mmol/L)   Date Value   06/29/2018 144    BUN (mg/dL)   Date Value   11/28/2017 20    Glucose (mg/dL)   Date Value   06/29/2018 97      Potassium (mmol/L)   Date Value   06/29/2018 4.0    CREATININE (mg/dL)   Date Value   06/29/2018 0.88         Hyperlipidemia:  No new myalgias or GI upset on simvastatin (Zocor).      Lab Results   Component Value Date    CHOL 138 05/22/2018    TRIG 174 (A) 05/22/2018    HDL 41 05/22/2018    LDLCALC 62 05/22/2018     Lab Results   Component Value Date    ALT 14 06/29/2018    AST 20 06/29/2018          Patient Active Problem List   Diagnosis    Carpal tunnel syndrome    Parkinson disease ([facility]    Coronary artery disease involving native coronary artery of native heart without angina pectoris    Essential hypertension    Idiopathic chronic gout of

## 2019-01-10 ENCOUNTER — OFFICE VISIT (OUTPATIENT)
Dept: FAMILY MEDICINE CLINIC | Age: 84
End: 2019-01-10
Payer: MEDICARE

## 2019-01-10 VITALS
HEART RATE: 68 BPM | BODY MASS INDEX: 22.18 KG/M2 | TEMPERATURE: 97.1 F | HEIGHT: 66 IN | RESPIRATION RATE: 12 BRPM | WEIGHT: 138 LBS | DIASTOLIC BLOOD PRESSURE: 82 MMHG | SYSTOLIC BLOOD PRESSURE: 118 MMHG

## 2019-01-10 DIAGNOSIS — I10 ESSENTIAL HYPERTENSION: ICD-10-CM

## 2019-01-10 DIAGNOSIS — G20 PARKINSON DISEASE (HCC): Primary | ICD-10-CM

## 2019-01-10 DIAGNOSIS — E78.2 MIXED HYPERLIPIDEMIA: ICD-10-CM

## 2019-01-10 DIAGNOSIS — G56.03 BILATERAL CARPAL TUNNEL SYNDROME: ICD-10-CM

## 2019-01-10 DIAGNOSIS — M1A.09X0 IDIOPATHIC CHRONIC GOUT OF MULTIPLE SITES WITHOUT TOPHUS: ICD-10-CM

## 2019-01-10 DIAGNOSIS — I25.10 CORONARY ARTERY DISEASE INVOLVING NATIVE CORONARY ARTERY OF NATIVE HEART WITHOUT ANGINA PECTORIS: ICD-10-CM

## 2019-01-10 PROCEDURE — G0008 ADMIN INFLUENZA VIRUS VAC: HCPCS | Performed by: FAMILY MEDICINE

## 2019-01-10 PROCEDURE — 90662 IIV NO PRSV INCREASED AG IM: CPT | Performed by: FAMILY MEDICINE

## 2019-01-10 PROCEDURE — 99214 OFFICE O/P EST MOD 30 MIN: CPT | Performed by: FAMILY MEDICINE

## 2019-03-01 ENCOUNTER — TELEPHONE (OUTPATIENT)
Dept: ADMINISTRATIVE | Age: 84
End: 2019-03-01

## 2019-03-01 ENCOUNTER — TELEPHONE (OUTPATIENT)
Dept: FAMILY MEDICINE CLINIC | Age: 84
End: 2019-03-01

## 2019-04-01 ENCOUNTER — TELEPHONE (OUTPATIENT)
Dept: ADMINISTRATIVE | Age: 84
End: 2019-04-01

## 2019-05-17 ENCOUNTER — TELEPHONE (OUTPATIENT)
Dept: FAMILY MEDICINE CLINIC | Age: 84
End: 2019-05-17

## 2020-11-14 NOTE — TELEPHONE ENCOUNTER
Pt spoke with Rishi Meraz. Scheduled appt form 9/6/18 and asked her to request short term supply of Fosamax 70 mg tablet. He needs only 3 pills to hold til his appt with you.   Send to [facility] Subjective:       Patient ID: Elin Dc is a 15 y.o. female.    Chief Complaint: Cough, Chest Congestion, and Asthma (pt with symptoms since Halloween night where there was a big bonfire)    Cough, congestion and wheezing that started 10/31 at a bon fire.     Cough  The current episode started 1 to 4 weeks ago. The cough is non-productive. Associated symptoms include nasal congestion, postnasal drip and wheezing. Pertinent negatives include no ear pain, fever, headaches, myalgias, sore throat or shortness of breath. She has tried a beta-agonist inhaler for the symptoms. Her past medical history is significant for asthma.     Review of Systems   Constitutional: Positive for fatigue. Negative for appetite change and fever.   HENT: Positive for congestion and postnasal drip. Negative for ear pain and sore throat.    Eyes: Negative.  Negative for visual disturbance.   Respiratory: Positive for cough and wheezing. Negative for shortness of breath.    Cardiovascular: Negative.    Gastrointestinal: Negative.  Negative for abdominal pain, diarrhea, nausea and vomiting.   Endocrine: Negative.    Genitourinary: Negative.  Negative for difficulty urinating and urgency.   Musculoskeletal: Negative.  Negative for arthralgias and myalgias.   Skin: Negative.  Negative for color change.   Allergic/Immunologic: Negative.    Neurological: Negative.  Negative for dizziness and headaches.   Hematological: Negative.    Psychiatric/Behavioral: Negative.  Negative for sleep disturbance.   All other systems reviewed and are negative.      Objective:      Physical Exam  Vitals signs and nursing note reviewed. Exam conducted with a chaperone present.   Constitutional:       Appearance: Normal appearance. She is well-developed.   HENT:      Head: Normocephalic and atraumatic.      Right Ear: Tympanic membrane and external ear normal.      Left Ear: Tympanic membrane and external ear normal.      Nose: Mucosal edema and congestion present.       Mouth/Throat:      Pharynx: Uvula midline.   Eyes:      Conjunctiva/sclera: Conjunctivae normal.   Neck:      Musculoskeletal: Normal range of motion and neck supple.      Thyroid: No thyromegaly.      Trachea: Trachea normal.   Cardiovascular:      Rate and Rhythm: Normal rate and regular rhythm.      Pulses: Normal pulses.      Heart sounds: Normal heart sounds, S1 normal and S2 normal. No murmur.   Pulmonary:      Effort: Pulmonary effort is normal. No respiratory distress.      Breath sounds: Wheezing (at the left side) and rhonchi (at the left side) present.   Musculoskeletal: Normal range of motion.   Lymphadenopathy:      Cervical: No cervical adenopathy.   Skin:     General: Skin is warm and dry.   Neurological:      Mental Status: She is alert and oriented to person, place, and time.   Psychiatric:         Mood and Affect: Mood normal.         Speech: Speech normal.         Assessment:       1. Cough    2. Moderate persistent asthmatic bronchitis with acute exacerbation    3. Nasal congestion        Plan:   Elin was seen today for cough, chest congestion and asthma.    Diagnoses and all orders for this visit:    Cough  -     COVID-19 Routine Screening  -     azithromycin (ZITHROMAX) 500 MG tablet; Take 1 tablet (500 mg total) by mouth once daily.  -     predniSONE (DELTASONE) 20 MG tablet; Take 1 tablet (20 mg total) by mouth once daily. for 3 days    Moderate persistent asthmatic bronchitis with acute exacerbation  -     albuterol (PROVENTIL/VENTOLIN HFA) 90 mcg/actuation inhaler; Inhale 2 puffs into the lungs every 6 (six) hours as needed for Wheezing.    Nasal congestion  -     COVID-19 Routine Screening  -     azithromycin (ZITHROMAX) 500 MG tablet; Take 1 tablet (500 mg total) by mouth once daily.  -     predniSONE (DELTASONE) 20 MG tablet; Take 1 tablet (20 mg total) by mouth once daily. for 3 days    RTC PRN

## 2021-04-22 LAB
ALBUMIN SERPL-MCNC: 4.1 G/DL (ref 3.5–4.6)
ALP BLD-CCNC: 124 U/L (ref 35–104)
ALT SERPL-CCNC: 15 U/L (ref 0–41)
ANION GAP SERPL CALCULATED.3IONS-SCNC: 11 MEQ/L (ref 9–15)
AST SERPL-CCNC: 19 U/L (ref 0–40)
BILIRUB SERPL-MCNC: 0.9 MG/DL (ref 0.2–0.7)
BUN BLDV-MCNC: 18 MG/DL (ref 8–23)
CALCIUM SERPL-MCNC: 9.6 MG/DL (ref 8.5–9.9)
CHLORIDE BLD-SCNC: 100 MEQ/L (ref 95–107)
CHOLESTEROL, TOTAL: 140 MG/DL (ref 0–199)
CO2: 29 MEQ/L (ref 20–31)
CREAT SERPL-MCNC: 0.86 MG/DL (ref 0.7–1.2)
GFR AFRICAN AMERICAN: >60
GFR NON-AFRICAN AMERICAN: >60
GLOBULIN: 3.4 G/DL (ref 2.3–3.5)
GLUCOSE BLD-MCNC: 152 MG/DL (ref 70–99)
HBA1C MFR BLD: 6 % (ref 4.8–5.9)
HCT VFR BLD CALC: 45.3 % (ref 42–52)
HDLC SERPL-MCNC: 47 MG/DL (ref 40–59)
HEMOGLOBIN: 15.1 G/DL (ref 14–18)
LDL CHOLESTEROL CALCULATED: 79 MG/DL (ref 0–129)
MCH RBC QN AUTO: 30.8 PG (ref 27–31.3)
MCHC RBC AUTO-ENTMCNC: 33.4 % (ref 33–37)
MCV RBC AUTO: 92.1 FL (ref 80–100)
PDW BLD-RTO: 15.5 % (ref 11.5–14.5)
PLATELET # BLD: 104 K/UL (ref 130–400)
POTASSIUM SERPL-SCNC: 4 MEQ/L (ref 3.4–4.9)
PRO-BNP: 1333 PG/ML
RBC # BLD: 4.92 M/UL (ref 4.7–6.1)
SODIUM BLD-SCNC: 140 MEQ/L (ref 135–144)
TOTAL PROTEIN: 7.5 G/DL (ref 6.3–8)
TRIGL SERPL-MCNC: 72 MG/DL (ref 0–150)
WBC # BLD: 7 K/UL (ref 4.8–10.8)

## 2021-04-23 LAB
PROSTATE SPECIFIC ANTIGEN FREE: 0.4 UG/L
PROSTATE SPECIFIC ANTIGEN PERCENT FREE: 9.8 %
PROSTATE SPECIFIC ANTIGEN: 4.1 UG/L (ref 0–4)

## 2022-01-21 LAB
ALBUMIN SERPL-MCNC: 3.5 G/DL (ref 3.5–4.6)
ALP BLD-CCNC: 168 U/L (ref 35–104)
ALT SERPL-CCNC: 10 U/L (ref 0–41)
ANION GAP SERPL CALCULATED.3IONS-SCNC: 14 MEQ/L (ref 9–15)
AST SERPL-CCNC: 19 U/L (ref 0–40)
BASOPHILS ABSOLUTE: 0 K/UL (ref 0–0.2)
BASOPHILS RELATIVE PERCENT: 0.3 %
BILIRUB SERPL-MCNC: 0.4 MG/DL (ref 0.2–0.7)
BILIRUBIN DIRECT: <0.2 MG/DL (ref 0–0.4)
BILIRUBIN, INDIRECT: NORMAL MG/DL (ref 0–0.6)
BUN BLDV-MCNC: 19 MG/DL (ref 8–23)
C-REACTIVE PROTEIN: 8.6 MG/L (ref 0–5)
CALCIUM SERPL-MCNC: 10 MG/DL (ref 8.5–9.9)
CHLORIDE BLD-SCNC: 102 MEQ/L (ref 95–107)
CO2: 27 MEQ/L (ref 20–31)
CREAT SERPL-MCNC: 0.71 MG/DL (ref 0.7–1.2)
D DIMER: >20 MG/L FEU (ref 0–0.5)
EOSINOPHILS ABSOLUTE: 0.1 K/UL (ref 0–0.7)
EOSINOPHILS RELATIVE PERCENT: 1.4 %
GFR AFRICAN AMERICAN: >60
GFR NON-AFRICAN AMERICAN: >60
GLOBULIN: 4.1 G/DL (ref 2.3–3.5)
GLUCOSE BLD-MCNC: 151 MG/DL (ref 70–99)
HCT VFR BLD CALC: 39.4 % (ref 42–52)
HEMOGLOBIN: 12.7 G/DL (ref 14–18)
INR BLD: 1
LACTATE DEHYDROGENASE: 258 U/L (ref 135–225)
LYMPHOCYTES ABSOLUTE: 1 K/UL (ref 1–4.8)
LYMPHOCYTES RELATIVE PERCENT: 13.2 %
MCH RBC QN AUTO: 30 PG (ref 27–31.3)
MCHC RBC AUTO-ENTMCNC: 32.3 % (ref 33–37)
MCV RBC AUTO: 92.8 FL (ref 80–100)
MONOCYTES ABSOLUTE: 0.5 K/UL (ref 0.2–0.8)
MONOCYTES RELATIVE PERCENT: 7.4 %
NEUTROPHILS ABSOLUTE: 5.7 K/UL (ref 1.4–6.5)
NEUTROPHILS RELATIVE PERCENT: 77.7 %
PDW BLD-RTO: 16 % (ref 11.5–14.5)
PLATELET # BLD: 161 K/UL (ref 130–400)
POTASSIUM SERPL-SCNC: 4.1 MEQ/L (ref 3.4–4.9)
PROCALCITONIN: 0.05 NG/ML (ref 0–0.15)
PROTHROMBIN TIME: 13 SEC (ref 12.3–14.9)
RBC # BLD: 4.25 M/UL (ref 4.7–6.1)
SEDIMENTATION RATE, ERYTHROCYTE: 33 MM (ref 0–20)
SODIUM BLD-SCNC: 143 MEQ/L (ref 135–144)
TOTAL CK: 65 U/L (ref 0–190)
TOTAL PROTEIN: 7.6 G/DL (ref 6.3–8)
TROPONIN: 0.01 NG/ML (ref 0–0.01)
WBC # BLD: 7.3 K/UL (ref 4.8–10.8)

## 2022-01-21 RX ORDER — ZINC GLUCONATE 50 MG
50 TABLET ORAL DAILY
COMMUNITY
End: 2022-02-24

## 2022-01-21 RX ORDER — ASCORBIC ACID 500 MG
500 TABLET ORAL DAILY
COMMUNITY

## 2022-01-22 LAB — FERRITIN: 261 UG/L (ref 30–400)

## 2022-01-24 ENCOUNTER — OFFICE VISIT (OUTPATIENT)
Dept: GERIATRIC MEDICINE | Age: 87
End: 2022-01-24
Payer: MEDICARE

## 2022-01-24 DIAGNOSIS — U07.1 COVID: Primary | ICD-10-CM

## 2022-01-24 DIAGNOSIS — I10 PRIMARY HYPERTENSION: ICD-10-CM

## 2022-01-24 DIAGNOSIS — M62.81 MUSCLE WEAKNESS: ICD-10-CM

## 2022-01-24 DIAGNOSIS — G20 PARKINSON DISEASE (HCC): ICD-10-CM

## 2022-01-24 PROCEDURE — 99305 1ST NF CARE MODERATE MDM 35: CPT | Performed by: INTERNAL MEDICINE

## 2022-01-25 ENCOUNTER — OFFICE VISIT (OUTPATIENT)
Dept: GERIATRIC MEDICINE | Age: 87
End: 2022-01-25
Payer: MEDICARE

## 2022-01-25 DIAGNOSIS — E78.5 HYPERLIPIDEMIA, UNSPECIFIED HYPERLIPIDEMIA TYPE: ICD-10-CM

## 2022-01-25 DIAGNOSIS — I10 PRIMARY HYPERTENSION: ICD-10-CM

## 2022-01-25 DIAGNOSIS — U07.1 COVID: Primary | ICD-10-CM

## 2022-01-25 DIAGNOSIS — E44.1 MILD PROTEIN-CALORIE MALNUTRITION (HCC): ICD-10-CM

## 2022-01-25 DIAGNOSIS — I48.91 ATRIAL FIBRILLATION, UNSPECIFIED TYPE (HCC): ICD-10-CM

## 2022-01-25 DIAGNOSIS — G20 PARKINSON DISEASE (HCC): ICD-10-CM

## 2022-01-25 DIAGNOSIS — I25.10 CORONARY ARTERY DISEASE INVOLVING NATIVE CORONARY ARTERY OF NATIVE HEART WITHOUT ANGINA PECTORIS: ICD-10-CM

## 2022-01-25 DIAGNOSIS — M62.81 MUSCLE WEAKNESS: ICD-10-CM

## 2022-01-25 PROCEDURE — 99309 SBSQ NF CARE MODERATE MDM 30: CPT | Performed by: NURSE PRACTITIONER

## 2022-01-28 ENCOUNTER — OFFICE VISIT (OUTPATIENT)
Dept: GERIATRIC MEDICINE | Age: 87
End: 2022-01-28
Payer: MEDICARE

## 2022-01-28 DIAGNOSIS — G20 PARKINSON DISEASE (HCC): Primary | ICD-10-CM

## 2022-01-28 LAB
ALBUMIN SERPL-MCNC: 3.4 G/DL (ref 3.5–4.6)
ALP BLD-CCNC: 170 U/L (ref 35–104)
ALT SERPL-CCNC: 18 U/L (ref 0–41)
ANION GAP SERPL CALCULATED.3IONS-SCNC: 15 MEQ/L (ref 9–15)
AST SERPL-CCNC: 45 U/L (ref 0–40)
BASOPHILS ABSOLUTE: 0 K/UL (ref 0–0.2)
BASOPHILS RELATIVE PERCENT: 0.5 %
BILIRUB SERPL-MCNC: 0.4 MG/DL (ref 0.2–0.7)
BILIRUBIN DIRECT: <0.2 MG/DL (ref 0–0.4)
BILIRUBIN, INDIRECT: ABNORMAL MG/DL (ref 0–0.6)
BUN BLDV-MCNC: 16 MG/DL (ref 8–23)
C-REACTIVE PROTEIN: 3.7 MG/L (ref 0–5)
CALCIUM SERPL-MCNC: 9.7 MG/DL (ref 8.5–9.9)
CHLORIDE BLD-SCNC: 102 MEQ/L (ref 95–107)
CO2: 23 MEQ/L (ref 20–31)
CREAT SERPL-MCNC: 0.76 MG/DL (ref 0.7–1.2)
D DIMER: 1.25 MG/L FEU (ref 0–0.5)
EOSINOPHILS ABSOLUTE: 0.1 K/UL (ref 0–0.7)
EOSINOPHILS RELATIVE PERCENT: 1.9 %
GFR AFRICAN AMERICAN: >60
GFR NON-AFRICAN AMERICAN: >60
GLOBULIN: 3.6 G/DL (ref 2.3–3.5)
GLUCOSE BLD-MCNC: 183 MG/DL (ref 70–99)
HCT VFR BLD CALC: 35.2 % (ref 42–52)
HEMOGLOBIN: 11.6 G/DL (ref 14–18)
INR BLD: 1
LACTATE DEHYDROGENASE: 280 U/L (ref 135–225)
LYMPHOCYTES ABSOLUTE: 0.9 K/UL (ref 1–4.8)
LYMPHOCYTES RELATIVE PERCENT: 17 %
MCH RBC QN AUTO: 30.3 PG (ref 27–31.3)
MCHC RBC AUTO-ENTMCNC: 32.9 % (ref 33–37)
MCV RBC AUTO: 92.1 FL (ref 80–100)
MONOCYTES ABSOLUTE: 0.5 K/UL (ref 0.2–0.8)
MONOCYTES RELATIVE PERCENT: 9.7 %
NEUTROPHILS ABSOLUTE: 3.9 K/UL (ref 1.4–6.5)
NEUTROPHILS RELATIVE PERCENT: 70.9 %
PDW BLD-RTO: 16.1 % (ref 11.5–14.5)
PLATELET # BLD: 140 K/UL (ref 130–400)
POTASSIUM SERPL-SCNC: 4 MEQ/L (ref 3.4–4.9)
PROCALCITONIN: 0.03 NG/ML (ref 0–0.15)
PROTHROMBIN TIME: 13.3 SEC (ref 12.3–14.9)
RBC # BLD: 3.82 M/UL (ref 4.7–6.1)
SEDIMENTATION RATE, ERYTHROCYTE: 28 MM (ref 0–20)
SODIUM BLD-SCNC: 140 MEQ/L (ref 135–144)
TOTAL CK: 64 U/L (ref 0–190)
TOTAL PROTEIN: 7 G/DL (ref 6.3–8)
TROPONIN: <0.01 NG/ML (ref 0–0.01)
WBC # BLD: 5.5 K/UL (ref 4.8–10.8)

## 2022-01-28 PROCEDURE — 99308 SBSQ NF CARE LOW MDM 20: CPT | Performed by: NURSE PRACTITIONER

## 2022-01-29 LAB — FERRITIN: 276 UG/L (ref 30–400)

## 2022-02-01 ENCOUNTER — OFFICE VISIT (OUTPATIENT)
Dept: GERIATRIC MEDICINE | Age: 87
End: 2022-02-01
Payer: MEDICARE

## 2022-02-01 DIAGNOSIS — I25.10 CORONARY ARTERY DISEASE INVOLVING NATIVE CORONARY ARTERY OF NATIVE HEART WITHOUT ANGINA PECTORIS: Primary | ICD-10-CM

## 2022-02-01 PROCEDURE — 99308 SBSQ NF CARE LOW MDM 20: CPT | Performed by: NURSE PRACTITIONER

## 2022-02-13 PROBLEM — U07.1 COVID: Status: ACTIVE | Noted: 2022-02-13

## 2022-02-13 PROBLEM — M62.81 MUSCLE WEAKNESS: Status: ACTIVE | Noted: 2022-02-13

## 2022-02-13 PROBLEM — E44.1 MILD PROTEIN-CALORIE MALNUTRITION (HCC): Status: ACTIVE | Noted: 2022-02-13

## 2022-02-13 NOTE — PROGRESS NOTES
Connections:     Frequency of Communication with Friends and Family: Not on file    Frequency of Social Gatherings with Friends and Family: Not on file    Attends Yarsani Services: Not on file    Active Member of Clubs or Organizations: Not on file    Attends Club or Organization Meetings: Not on file    Marital Status: Not on file   Intimate Partner Violence:     Fear of Current or Ex-Partner: Not on file    Emotionally Abused: Not on file    Physically Abused: Not on file    Sexually Abused: Not on file   Housing Stability:     Unable to Pay for Housing in the Last Year: Not on file    Number of [place] in the Last Year: Not on file    Unstable Housing in the Last Year: Not on file       Patient has no known allergies. MEDICATIONS:   Dulcolax suppository 10 mg suppository daily as needed   Famotidine 20 mg tab p.o. twice daily Melatonin 5 mg tab p.o. twice daily x7 days   Milk of magnesia suspension 1200 mg per 15 mL give 30 mL p.o. daily as needed Ropinirole HCL tab 1 mg give 1 mg p.o. 4 times daily   Sotalol HCl 40 mg tab p.o. twice daily Vitamin C tab 500 mg p.o. twice daily Vitamin D3 tab 50 MCG p.o. daily   Zinc 50 mg tab p.o. daily    REVIEW OF SYSTEMS:  Resident denies any headache, dizziness, blurred vision. He denies any fever, chills, sore throat. He denies any rashes, bruises, or open areas. He denies any chest pain, chest discomfort, or heart palpitations. He denies any shortness of breath. He reports moist cough with white sputum. He also reports a runny nose with clear drainage. He denies any nausea, vomiting, or abdominal pain. He denies any urinary urgency, frequency, or dysuria. He denies any constipation or diarrhea. He states he is eating okay, sleeping okay, and he currently has no pain. PHYSICAL EXAMINATION:  Most recent vital signs: Blood pressure 133/72, temp 97.6, heart rate 71, respirations 18, pulse ox 99% on 2 L of O2, weight 140 pounds. Constitutional:  Resident is a 80 y.o. male alert, pleasant, and cooperative with exam.  In no apparent distress. Integument:  Pink, warm, dry. Scattered bruises abdomen and bilateral upper extremities. No rashes or open areas visible. HEENT:  Normocephalic, atraumatic. External ears appear to be within normal limits. He is hard of hearing. Conjunctivae pink. Sclerae nonicteric. Mucous membranes pink, moist.  No oropharyngeal exudate. Neck:  Supple. No cervical or clavicular lymphadenopathy. No masses noted. Cardiovascular:  Heart rate regular rate and rhythm. No murmurs, gallops, rubs noted. Respiratory:  Lung sounds Normal.  Respirations nonlabored. The patient is not using accessory muscles with breathing. Currently pulse oxing 98% on 2 L of oxygen. Abdomen:  Soft, nontender, nondistended, normoactive bowel sounds. No masses noted. Musculoskeletal: No muscle tenderness. No joint tenderness. PV:  Peripheral pulses present. He  does not have any edema to his extremities. Psychological: Mood stable. Affect pleasant. Speech normal rate and tone. ASSESSMENT AND PLAN:      Diagnosis Orders   1. COVID     2. Parkinson disease ([address]     3. Mild protein-calorie malnutrition ([address]     4. Primary hypertension     5. Atrial fibrillation, unspecified type ([address]     6. Hyperlipidemia, unspecified hyperlipidemia type     7. Muscle weakness     8. Coronary artery disease involving native coronary artery of native heart without angina pectoris          1. Respiratory status stable. No hypoxic episodes or episodes of respiratory distress. He is currently pulse oxing 98% on 2 L of oxygen. No use of accessory muscles with breathing. 2. No exacerbation of his symptoms. We will continue his ropinirole as ordered. 3. Patient is eating 50% to 100% of his meals he has been by the dietitian we will continue to monitor his p.o. intake closely.   4. Blood pressure stable he has not had any hypertensive or hypotensive events since his admission. 5. Patient is currently in a regular rhythm. He has not had any chest pain, chest discomfort, or palpitations. We will continue his sotalol as ordered. 6. Most recent lipid panel total cholesterol 140, triglycerides 72, LDL 79.  7. Patient will work with PT and OT. 8. Patient has not had any chest pain or chest discomfort. We will continue to monitor. I have reviewed this resident's medication and treatment plan as well as most recent lab work. No further changes will be made at this time. Return in about 1 week (around 2/1/2022), or if symptoms worsen or fail to improve. Please note this report is partially produced by using speech recognition hardware. It may contain errors related to the system, including grammar, punctuation and spelling as well as words and phrases that may seem inaccurate. For any questions or concerns feel free to contact me for clarification        Electronically Signed By: Quinn Mast. Dennie Caprio, CNP on 2/13/22   ______________________________  Quinn Mast.  Baltazar LIU CNP

## 2022-02-16 ENCOUNTER — OFFICE VISIT (OUTPATIENT)
Dept: GERIATRIC MEDICINE | Age: 87
End: 2022-02-16
Payer: MEDICARE

## 2022-02-16 DIAGNOSIS — E78.2 MIXED HYPERLIPIDEMIA: ICD-10-CM

## 2022-02-16 DIAGNOSIS — I50.40 COMBINED SYSTOLIC AND DIASTOLIC HEART FAILURE, UNSPECIFIED HF CHRONICITY (HCC): ICD-10-CM

## 2022-02-16 DIAGNOSIS — I48.0 PAROXYSMAL ATRIAL FIBRILLATION (HCC): ICD-10-CM

## 2022-02-16 DIAGNOSIS — I25.10 CORONARY ARTERY DISEASE INVOLVING NATIVE CORONARY ARTERY OF NATIVE HEART WITHOUT ANGINA PECTORIS: ICD-10-CM

## 2022-02-16 DIAGNOSIS — U07.1 COVID: Primary | ICD-10-CM

## 2022-02-16 DIAGNOSIS — I10 PRIMARY HYPERTENSION: ICD-10-CM

## 2022-02-16 DIAGNOSIS — G20 PARKINSON DISEASE (HCC): ICD-10-CM

## 2022-02-16 PROCEDURE — 99316 NF DSCHRG MGMT 30 MIN+: CPT | Performed by: NURSE PRACTITIONER

## 2022-02-18 NOTE — PROGRESS NOTES
Nursing Home:  [address] [place] [address]    The patient is being seen today for acute visit for:  Chief Complaint   Patient presents with    Other     Parkinson's         SUBJECTIVE: Patient being seen today for acute visit for Parkinson's disease. FAMILY AND SOCIAL HISTORY:  Refer to H&P. Past Medical History:   Diagnosis Date    Atrial fibrillation ([facility]     CAD (coronary artery disease)     Carpal tunnel syndrome     Cholesterol serum increased     Diverticulosis     HISTORY OF    Epistaxis     Gout     History of prostate cancer     Hyperlipidemia     Hypertension     Parkinson disease ([facility]        MEDICATIONS AND ALLERGIES:  Reviewed on chart at nursing facility. Current Outpatient Medications on File Prior to Visit   Medication Sig Dispense Refill    ascorbic acid (VITAMIN C) 500 MG tablet Take 500 mg by mouth 2 times daily Indications: 2019 Novel Coronavirus      zinc 50 MG TABS tablet Take 50 mg by mouth daily Indications: 2019 Novel Coronavirus      sotalol (BETAPACE) 80 MG tablet Take 40 mg by mouth 2 times daily Indications: High Blood Pressure Disorder       rOPINIRole (REQUIP) 0.25 MG tablet Take 1 mg by mouth 4 times daily Indications: Parkinson's Disease       Cholecalciferol (VITAMIN D3) 50 MCG (2000 UT) TABS Take 2,000 Units by mouth daily Indications: Nutritional Support       [DISCONTINUED] darifenacin (ENABLEX) 15 MG SR tablet Take 1 tablet by mouth daily. 90 tablet 3     Current Facility-Administered Medications on File Prior to Visit   Medication Dose Route Frequency Provider Last Rate Last Admin    triamcinolone acetonide (KENALOG-40) injection 80 mg  80 mg IntraMUSCular Once Aracelis Butler MD           REVIEW OF SYSTEMS:  Resident denies any headache, dizziness, blurred vision. He denies any fever, chills, sore throat. He denies any rashes, bruises, or open areas. He denies any chest pain, chest discomfort, or heart palpitations.   He denies any shortness of breath or cough. He denies any nausea, vomiting, or abdominal pain. He denies any urinary urgency, frequency, or dysuria. He denies any constipation or diarrhea. He states he is eating okay, sleeping okay, and he currently has no pain. He reports he is participating in therapy and feels he is making progress. PHYSICAL EXAMINATION:  Most recent vital signs: Blood pressure 133/72, temperature 97.3, heart rate 71, respirations 18, pulse ox 100%, weight 140 pounds. Constitutional:  Resident is a 80 y.o. male alert, pleasant, and cooperative with exam.  In no apparent distress. Integument:  Pink, warm, dry. No rashes, bruises, or open areas visible. HEENT:  Normocephalic, atraumatic. External ears appear to be within normal limits. He is hard of hearing. Conjunctivae pink. Sclerae nonicteric. Mucous membranes pink, moist.  No oropharyngeal exudate. Neck:  Supple. No cervical or clavicular lymphadenopathy. No masses noted. Cardiovascular:  Heart rate regular rate and rhythm. No murmurs, gallops, rubs noted. Respiratory:  Lung sounds Normal.  Respirations nonlabored. The patient is not using accessory muscles with breathing. Currently pulse oxing 100% on room air. Abdomen:  Soft, nontender, nondistended, normoactive bowel sounds. No masses noted. Musculoskeletal: No muscle tenderness. No joint tenderness. PV:  Peripheral pulses present. He  does not have any edema to his extremities. Psychological: Mood stable. Affect pleasant. Speech normal rate and tone. ASSESSMENT AND PLAN:      Diagnosis Orders   1. Parkinson disease ([facility]          1. Patient has not had any exacerbation of his symptoms we will continue continue his ropinirole as ordered. He will continue to work with PT and OT. I have reviewed this resident's medication and treatment plan as well as most recent lab work. No further changes will be made at this time.       Return in about 1 week (around 2/4/2022). Please note this report is partially produced by using speech recognition hardware. It may contain errors related to the system, including grammar, punctuation and spelling as well as words and phrases that may seem inaccurate. For any questions or concerns feel free to contact me for clarification        Electronically Signed By: Estefani Neumann. Judy Friedman CNP on 2/18/22   ______________________________  Estefani Neumann.  Baltazar LIU CNP

## 2022-02-20 NOTE — PROGRESS NOTES
Nursing Home:  [address] [place] [address]    The patient is being seen today for acute visit for:  Chief Complaint   Patient presents with    Coronary Artery Disease         SUBJECTIVE: Patient being seen today for acute visit for CAD. FAMILY AND SOCIAL HISTORY:  Refer to H&P. Past Medical History:   Diagnosis Date    Atrial fibrillation ([facility]     CAD (coronary artery disease)     Carpal tunnel syndrome     Cholesterol serum increased     Diverticulosis     HISTORY OF    Epistaxis     Gout     History of prostate cancer     Hyperlipidemia     Hypertension     Parkinson disease ([facility]        MEDICATIONS AND ALLERGIES:  Reviewed on chart at nursing facility. Current Outpatient Medications on File Prior to Visit   Medication Sig Dispense Refill    ascorbic acid (VITAMIN C) 500 MG tablet Take 500 mg by mouth 2 times daily Indications: 2019 Novel Coronavirus      zinc 50 MG TABS tablet Take 50 mg by mouth daily Indications: 2019 Novel Coronavirus      sotalol (BETAPACE) 80 MG tablet Take 40 mg by mouth 2 times daily Indications: High Blood Pressure Disorder       rOPINIRole (REQUIP) 0.25 MG tablet Take 1 mg by mouth 4 times daily Indications: Parkinson's Disease       Cholecalciferol (VITAMIN D3) 50 MCG (2000 UT) TABS Take 2,000 Units by mouth daily Indications: Nutritional Support       [DISCONTINUED] darifenacin (ENABLEX) 15 MG SR tablet Take 1 tablet by mouth daily. 90 tablet 3     Current Facility-Administered Medications on File Prior to Visit   Medication Dose Route Frequency Provider Last Rate Last Admin    triamcinolone acetonide (KENALOG-40) injection 80 mg  80 mg IntraMUSCular Once Shilpa Morton MD           REVIEW OF SYSTEMS:  Resident denies any headache, dizziness, blurred vision. He denies any fever, chills, sore throat. He denies any rashes, bruises, or open areas. He denies any chest pain, chest discomfort, or heart palpitations.   He denies any shortness of breath or cough. He denies any nausea, vomiting, or abdominal pain. He denies any urinary urgency, frequency, or dysuria. He denies any constipation or diarrhea. He states he is eating okay, sleeping okay, and he currently has no pain. He states he is doing well. He is participating in therapy. He feels he is getting stronger. PHYSICAL EXAMINATION:  Most recent vital signs: Blood pressure 118/68, temperature 97.4, heart rate 81, respirations 18, pulse ox 96% room air, weight 140 pounds. Constitutional:  Resident is a 80 y.o. male frail, alert, pleasant, and cooperative with exam.  In no apparent distress. Integument:  Pink, warm, dry. No visible rashes, bruises, or open areas. HEENT:  Normocephalic, atraumatic. External ears appear to be within normal limits. He is hard of hearing. Conjunctivae pink. Sclerae nonicteric. Mucous membranes pink, moist.  No oropharyngeal exudate. Neck:  Supple. No cervical or clavicular lymphadenopathy. No masses noted. Cardiovascular:  Heart rate regular rate and rhythm. No murmurs, gallops, rubs noted. Respiratory:  Lung sounds Normal.  Respirations nonlabored. The patient is not using accessory muscles with breathing. Currently pulse oxing 96% on room air. Abdomen:  Soft, nontender, nondistended, normoactive bowel sounds. No masses noted. Musculoskeletal: No muscle tenderness. No joint tenderness. Currently wheelchair dependent. PV:  Peripheral pulses present. He  does not have any edema to his extremities. Jaxon Awkward Psychological: Mood stable. Affect pleasant. Speech normal rate and tone. ASSESSMENT AND PLAN:      Diagnosis Orders   1. Coronary artery disease involving native coronary artery of native heart without angina pectoris          1. Pain chest discomfort or heart palpitations. We will continue to monitor closely. I have reviewed this resident's medication and treatment plan as well as most recent lab work.   No further changes will be made at this time. Return in about 1 week (around 2/8/2022), or if symptoms worsen or fail to improve. Please note this report is partially produced by using speech recognition hardware. It may contain errors related to the system, including grammar, punctuation and spelling as well as words and phrases that may seem inaccurate. For any questions or concerns feel free to contact me for clarification        Electronically Signed By: Deepa Turcios. Baltazar LIU CNP on 2/20/22   ______________________________  Deepa LIU CNP

## 2022-02-24 RX ORDER — CARBIDOPA, LEVODOPA AND ENTACAPONE 12.5; 200; 5 MG/1; MG/1; MG/1
1 TABLET, FILM COATED ORAL 4 TIMES DAILY
COMMUNITY

## 2022-02-24 RX ORDER — AMMONIUM LACTATE 12 G/100G
CREAM TOPICAL 2 TIMES DAILY
COMMUNITY

## 2022-02-24 RX ORDER — CLOPIDOGREL BISULFATE 75 MG/1
75 TABLET ORAL DAILY
COMMUNITY

## 2022-02-24 RX ORDER — ELECTROLYTES/DEXTROSE
1 SOLUTION, ORAL ORAL DAILY
COMMUNITY

## 2022-02-24 RX ORDER — AMANTADINE HYDROCHLORIDE 100 MG/1
100 TABLET ORAL 2 TIMES DAILY
COMMUNITY

## 2022-02-24 RX ORDER — ALENDRONATE SODIUM 70 MG/1
70 TABLET ORAL
COMMUNITY

## 2022-02-24 RX ORDER — MAGNESIUM OXIDE 400 MG/1
400 TABLET ORAL DAILY
COMMUNITY

## 2022-02-24 RX ORDER — FAMOTIDINE 20 MG/1
20 TABLET, FILM COATED ORAL 2 TIMES DAILY
COMMUNITY

## 2022-02-24 RX ORDER — ATORVASTATIN CALCIUM 40 MG/1
40 TABLET, FILM COATED ORAL DAILY
COMMUNITY

## 2022-02-24 RX ORDER — ASPIRIN 81 MG/1
81 TABLET ORAL DAILY
COMMUNITY

## 2022-02-24 NOTE — PROGRESS NOTES
Subjective:     CC: COVID-19    HPI:  Jatinder Ann is a 80 y.o. male was seen today for COVID 19 evaluation. Patient was admitted here after recent hospitalization patient had tested positive for COVID-19 on numerous treatments. Patient has been stabilized had another possibility of remdesivir patient was stabilized on steroid burst and transferred here for ongoing course of care. Patient transfer status improvements time globally frail somewhat confused at this time with a stable baseline functional status. Rate and isolation protocols. Patient has been COVID 19 positive since 1/23. They were seen with full PPE and observing continued droplet precautions. Condition discussed with nursing staff and treatment reviewed. Recent bloodwork, chest x-ray and vital signs reviewed. Fever curve and oxygen demands reviewed. Nutritional status and intake reviewed. Patient is demonstrating increased respiratory complaints at this time. Patient has not been febrile in the last 24 hours. Patient is able to feed themselves. Patient is demonstrating increased oxygen demand. Past Medical History:   Diagnosis Date    Atrial fibrillation ([facility]     CAD (coronary artery disease)     Carpal tunnel syndrome     Cholesterol serum increased     Diverticulosis     HISTORY OF    Epistaxis     Gout     History of prostate cancer     Hyperlipidemia     Hypertension     Parkinson disease ([facility]      Past Surgical History:   Procedure Laterality Date    AORTIC VALVE SURGERY  06/08/2017    DR. LEVINE    CARDIAC CATHETERIZATION  05/08/2017    DR. MASON    COLONOSCOPY  1/30/09    DR ALVARADO    COLONOSCOPY  04/07/14    DR. [facility]      UPPER GASTROINTESTINAL ENDOSCOPY  1/30/09    DR ALVARADO     No family history on file. Social History     Socioeconomic History    Marital status:       Spouse name: Not on file    Number of children: Not on file    Years of education: Not on file    Highest education level: Not on file   Occupational History    Not on file   Tobacco Use    Smoking status: Never Smoker    Smokeless tobacco: Never Used   Substance and Sexual Activity    Alcohol use: No    Drug use: Not on file    Sexual activity: Not on file   Other Topics Concern    Not on file   Social History Narrative    Not on file     Social Determinants of Health     Financial Resource Strain:     Difficulty of Paying Living Expenses: Not on file   Food Insecurity:     Worried About Running Out of Food in the Last Year: Not on file    Bon of Food in the Last Year: Not on file   Transportation Needs:     Lack of Transportation (Medical): Not on file    Lack of Transportation (Non-Medical):  Not on file   Physical Activity:     Days of Exercise per Week: Not on file    Minutes of Exercise per Session: Not on file   Stress:     Feeling of Stress : Not on file   Social Connections:     Frequency of Communication with Friends and Family: Not on file    Frequency of Social Gatherings with Friends and Family: Not on file    Attends Spiritism Services: Not on file    Active Member of [address] [place] [address] or Organizations: Not on file    Attends Club or Organization Meetings: Not on file    Marital Status: Not on file   Intimate Partner Violence:     Fear of Current or Ex-Partner: Not on file    Emotionally Abused: Not on file    Physically Abused: Not on file    Sexually Abused: Not on file   Housing Stability:     Unable to Pay for Housing in the Last Year: Not on file    Number of [place] in the Last Year: Not on file    Unstable Housing in the Last Year: Not on file     Current Outpatient Medications on File Prior to Visit   Medication Sig Dispense Refill    ascorbic acid (VITAMIN C) 500 MG tablet Take 500 mg by mouth 2 times daily Indications: 2019 Novel Coronavirus      zinc 50 MG TABS tablet Take 50 mg by mouth daily Indications: 2019 Novel Coronavirus      sotalol (BETAPACE) 80 MG tablet Take 40 mg by mouth 2 times daily Indications: High Blood Pressure Disorder       rOPINIRole (REQUIP) 0.25 MG tablet Take 1 mg by mouth 4 times daily Indications: Parkinson's Disease       Cholecalciferol (VITAMIN D3) 50 MCG (2000 UT) TABS Take 2,000 Units by mouth daily Indications: Nutritional Support       [DISCONTINUED] darifenacin (ENABLEX) 15 MG SR tablet Take 1 tablet by mouth daily. 90 tablet 3     Current Facility-Administered Medications on File Prior to Visit   Medication Dose Route Frequency Provider Last Rate Last Admin    triamcinolone acetonide (KENALOG-40) injection 80 mg  80 mg IntraMUSCular Once Shilpa Morton MD           Review of Systems   Unable to perform ROS: Other   All other systems reviewed and are negative. Objective:     Physical Exam  Vitals and nursing note reviewed. Constitutional:       Appearance: Normal appearance. He is normal weight. HENT:      Head: Normocephalic. Nose: Congestion present. Mouth/Throat:      Mouth: Mucous membranes are dry. Eyes:      Extraocular Movements: Extraocular movements intact. Cardiovascular:      Rate and Rhythm: Regular rhythm. Tachycardia present. Pulses: Normal pulses. Heart sounds: No murmur heard. Pulmonary:      Effort: Pulmonary effort is normal.      Breath sounds: Rhonchi present. Abdominal:      General: Bowel sounds are normal.      Palpations: Abdomen is soft. Tenderness: There is no abdominal tenderness. Musculoskeletal:         General: No tenderness. Normal range of motion. Cervical back: Neck supple. No tenderness. Right lower leg: Edema present. Skin:     General: Skin is warm. Findings: Bruising present. Neurological:      Mental Status: He is disoriented. Motor: Weakness present.    Psychiatric:         Mood and Affect: Mood normal.          .[facility]  Lab Results   Component Value Date    FERRITIN 276 01/28/2022     Lab Results Component Value Date    WBC 5.5 01/28/2022    HGB 11.6 (L) 01/28/2022    HCT 35.2 (L) 01/28/2022    MCV 92.1 01/28/2022     01/28/2022     Lab Results   Component Value Date     01/28/2022    K 4.0 01/28/2022     01/28/2022    CO2 23 01/28/2022    BUN 16 01/28/2022    CREATININE 0.76 01/28/2022    GLUCOSE 183 01/28/2022    GLUCOSE 97 06/29/2018    CALCIUM 9.7 01/28/2022      Lab Results   Component Value Date    CKTOTAL 64 01/28/2022    TROPONINI <0.010 01/28/2022     Lab Results   Component Value Date    CRP 3.7 01/28/2022      Lab Results   Component Value Date    DDIMER 1.25 ([place]) 01/28/2022      Lab Results   Component Value Date    CKTOTAL 64 01/28/2022       Please refer to on site chart for most recent chest Xray result, reviewed at this time. Assessment & Plan:      Diagnosis Orders   1. COVID     2. Muscle weakness     3. Parkinson disease ([facility]     4. Primary hypertension       Good course of physical therapy recommend therapy after treatments. Continue pressure support at this time. Continue with current regimen of  Vitamin C 500mg PO BID  Vitamin D 1000 IU PO QD  Zinc 50 mg PO QD  Lovenox 30mg SQ BID    Patient does require Dexamethasone 6mg PO QD  Patient does not  require antibiotics for concomitant bacterial pneumonia. Patient does not qualify for monoclonal antibodies. Continue to monitor blood work:  Weekly D-Dimer, LDH, CRP, CPK, Procalcitonin, Ferritin, Troponin, CBC, BMP. Patient's case discussed with nursing staff and Code Status reviewed. Please note orders entered on site at facility after discussion with appropriate facility nursing/therapy/ / nutritional staff. Current longstanding medical problems and acute medical issues addressed with staff. Available data and data elements in on site paper chart reviewed and analyzed. Current external consultant notes reviewed in on site chart.  Ordered laboratory testing and imaging will be reviewed when available.      Diego Louise MD

## 2022-02-28 ENCOUNTER — OFFICE VISIT (OUTPATIENT)
Dept: GERIATRIC MEDICINE | Age: 87
End: 2022-02-28
Payer: MEDICARE

## 2022-02-28 DIAGNOSIS — I48.20 CHRONIC ATRIAL FIBRILLATION (HCC): ICD-10-CM

## 2022-02-28 DIAGNOSIS — U07.1 COVID: Primary | ICD-10-CM

## 2022-02-28 DIAGNOSIS — E44.1 MILD PROTEIN-CALORIE MALNUTRITION (HCC): ICD-10-CM

## 2022-02-28 DIAGNOSIS — G20 PARKINSON DISEASE (HCC): ICD-10-CM

## 2022-02-28 DIAGNOSIS — I10 PRIMARY HYPERTENSION: ICD-10-CM

## 2022-02-28 PROCEDURE — 99305 1ST NF CARE MODERATE MDM 35: CPT | Performed by: INTERNAL MEDICINE

## 2022-03-01 LAB
ANION GAP SERPL CALCULATED.3IONS-SCNC: 12 MEQ/L (ref 9–15)
BUN BLDV-MCNC: 12 MG/DL (ref 8–23)
CALCIUM SERPL-MCNC: 9.9 MG/DL (ref 8.5–9.9)
CHLORIDE BLD-SCNC: 97 MEQ/L (ref 95–107)
CO2: 29 MEQ/L (ref 20–31)
CREAT SERPL-MCNC: 0.8 MG/DL (ref 0.7–1.2)
GFR AFRICAN AMERICAN: >60
GFR NON-AFRICAN AMERICAN: >60
GLUCOSE BLD-MCNC: 151 MG/DL (ref 70–99)
HCT VFR BLD CALC: 37.2 % (ref 42–52)
HEMOGLOBIN: 12 G/DL (ref 14–18)
MCH RBC QN AUTO: 29.7 PG (ref 27–31.3)
MCHC RBC AUTO-ENTMCNC: 32.3 % (ref 33–37)
MCV RBC AUTO: 91.8 FL (ref 80–100)
PDW BLD-RTO: 16.4 % (ref 11.5–14.5)
PLATELET # BLD: 206 K/UL (ref 130–400)
POTASSIUM SERPL-SCNC: 4.1 MEQ/L (ref 3.4–4.9)
RBC # BLD: 4.05 M/UL (ref 4.7–6.1)
SODIUM BLD-SCNC: 138 MEQ/L (ref 135–144)
WBC # BLD: 7.2 K/UL (ref 4.8–10.8)

## 2022-03-02 ENCOUNTER — OFFICE VISIT (OUTPATIENT)
Dept: GERIATRIC MEDICINE | Age: 87
End: 2022-03-02
Payer: MEDICARE

## 2022-03-02 DIAGNOSIS — I10 PRIMARY HYPERTENSION: ICD-10-CM

## 2022-03-02 DIAGNOSIS — I48.91 ATRIAL FIBRILLATION, UNSPECIFIED TYPE (HCC): ICD-10-CM

## 2022-03-02 DIAGNOSIS — R13.10 DYSPHAGIA, UNSPECIFIED TYPE: Primary | ICD-10-CM

## 2022-03-02 DIAGNOSIS — G20 PARKINSON DISEASE (HCC): ICD-10-CM

## 2022-03-02 DIAGNOSIS — I25.10 CORONARY ARTERY DISEASE INVOLVING NATIVE HEART, UNSPECIFIED VESSEL OR LESION TYPE, UNSPECIFIED WHETHER ANGINA PRESENT: ICD-10-CM

## 2022-03-02 DIAGNOSIS — I49.5 SICK SINUS SYNDROME (HCC): ICD-10-CM

## 2022-03-02 DIAGNOSIS — Z95.0 PRESENCE OF CARDIAC PACEMAKER: ICD-10-CM

## 2022-03-02 DIAGNOSIS — I50.30 DIASTOLIC HEART FAILURE, UNSPECIFIED HF CHRONICITY (HCC): ICD-10-CM

## 2022-03-02 PROCEDURE — 99309 SBSQ NF CARE MODERATE MDM 30: CPT | Performed by: NURSE PRACTITIONER

## 2022-03-04 ENCOUNTER — OFFICE VISIT (OUTPATIENT)
Dept: GERIATRIC MEDICINE | Age: 87
End: 2022-03-04
Payer: MEDICARE

## 2022-03-04 DIAGNOSIS — I49.5 SICK SINUS SYNDROME (HCC): ICD-10-CM

## 2022-03-04 DIAGNOSIS — R13.10 DYSPHAGIA, UNSPECIFIED TYPE: Primary | ICD-10-CM

## 2022-03-04 DIAGNOSIS — Z95.0 PRESENCE OF CARDIAC PACEMAKER: ICD-10-CM

## 2022-03-04 DIAGNOSIS — I25.10 CORONARY ARTERY DISEASE INVOLVING NATIVE HEART, UNSPECIFIED VESSEL OR LESION TYPE, UNSPECIFIED WHETHER ANGINA PRESENT: ICD-10-CM

## 2022-03-04 DIAGNOSIS — I50.30 DIASTOLIC HEART FAILURE, UNSPECIFIED HF CHRONICITY (HCC): ICD-10-CM

## 2022-03-04 DIAGNOSIS — I10 PRIMARY HYPERTENSION: ICD-10-CM

## 2022-03-04 DIAGNOSIS — I48.0 PAROXYSMAL ATRIAL FIBRILLATION (HCC): ICD-10-CM

## 2022-03-04 DIAGNOSIS — G20 PARKINSON DISEASE (HCC): ICD-10-CM

## 2022-03-04 PROCEDURE — 99316 NF DSCHRG MGMT 30 MIN+: CPT | Performed by: NURSE PRACTITIONER

## 2022-03-06 NOTE — PROGRESS NOTES
Nursing Home:  [address] [place] [address]    The patient is being seen today for discharge summary:  Chief Complaint   Patient presents with    Other     Discharge Summary         SUBJECTIVE: Patient being seen today for discharge summary. Patient was originally admitted to this facility from the hospital with primary diagnosis of COVID-19, A. fib, combination systolic and diastolic heart failure, Parkinson's disease, essential hypertension, hyperlipidemia, and CAD. He has completed therapy and is now ready to be discharged home. FAMILY AND SOCIAL HISTORY:  Refer to H&P. Past Medical History:   Diagnosis Date    Atrial fibrillation ([facility]     CAD (coronary artery disease)     Carpal tunnel syndrome     Cholesterol serum increased     Diverticulosis     HISTORY OF    Epistaxis     Gout     History of prostate cancer     Hyperlipidemia     Hypertension     [facility]      No family history on file. Social History     Socioeconomic History    Marital status:      Spouse name: Not on file    Number of children: Not on file    Years of education: Not on file    Highest education level: Not on file   Occupational History    Not on file   Tobacco Use    Smoking status: Never Smoker    Smokeless tobacco: Never Used   Substance and Sexual Activity    Alcohol use: No    Drug use: Not on file    Sexual activity: Not on file   Other Topics Concern    Not on file   Social History Narrative    Not on file     Social Determinants of Health     Financial Resource Strain:     Difficulty of Paying Living Expenses: Not on file   Food Insecurity:     Worried About Running Out of Food in the Last Year: Not on file    Bon of Food in the Last Year: Not on file   Transportation Needs:     Lack of Transportation (Medical): Not on file    Lack of Transportation (Non-Medical):  Not on file   Physical Activity:     Days of Exercise per Week: Not on file    Minutes of Exercise per Session: Not on file   Stress:     Feeling of Stress : Not on file   Social Connections:     Frequency of Communication with Friends and Family: Not on file    Frequency of Social Gatherings with Friends and Family: Not on file    Attends Denominational Services: Not on file    Active Member of Clubs or Organizations: Not on file    Attends Club or Organization Meetings: Not on file    Marital Status: Not on file   Intimate Partner Violence:     Fear of Current or Ex-Partner: Not on file    Emotionally Abused: Not on file    Physically Abused: Not on file    Sexually Abused: Not on file   Housing Stability:     Unable to Pay for Housing in the Last Year: Not on file    Number of [place] in the Last Year: Not on file    Unstable Housing in the Last Year: Not on file      ALLERGIES:  Patient has no known allergies. MEDICATIONS: Alendronate sodium tablet 70 mg tab p.o. every Wednesday, amantadine HCL tab 100 mg tab p.o. twice daily, ASA 81 mg delayed release tab p.o. daily, atorvastatin calcium 40 mg tab p.o. daily, carbidopa-levodopa-entacapone tablet -2 100 mg tab p.o. 4 times daily, Plavix 75 mg tab p.o. daily, Dulcolax suppository 10 mg rectal suppository daily as needed, famotidine 20 mg tab p.o. twice daily, magnesium oxide tablet 400 mg tab p.o. daily, milk of magnesia suspension 1200 mg per 15 mL give 30 mL p.o. daily as needed, PreserVision AREDS capsule 1 capsule p.o. daily, ropinirole 1 mg tab p.o. 4 times daily, sotalol 40 mg tab p.o. twice daily, vitamin C 500 mg p.o. twice daily, vitamin D3 50 MCG p.o. daily, zinc tablet 50 mg tab p.o. daily. REVIEW OF SYSTEMS:  Resident denies any headache, dizziness, blurred vision. He denies any fever, chills, sore throat. He denies any rashes, bruises, or open areas. He denies any chest pain, chest discomfort, or heart palpitations. He denies any shortness of breath or cough.   He denies any nausea, vomiting, or abdominal pain.  He denies any urinary urgency, frequency, or dysuria. He denies any constipation or diarrhea. He states he is eating okay, sleeping okay, and he currently has no pain. He is looking forward to going home. PHYSICAL EXAMINATION:  Most recent vital signs: Blood pressure 110/68, temperature 98.2, heart rate 71, respirations 18, pulse ox 95%, weight 140 pounds. Constitutional:  Resident is a 80 y.o. male alert, pleasant, and cooperative with exam.  In no apparent distress. Integument:  Pink, warm, dry. No visible rashes, bruises, or open areas. HEENT:  Normocephalic, atraumatic. External ears appear to be within normal limits. He is hard of hearing. Conjunctivae pink. Sclerae nonicteric. Mucous membranes pink, moist.  No oropharyngeal exudate. Neck:  Supple. No cervical or clavicular lymphadenopathy. No masses noted. Cardiovascular:  Heart rate regular rate and rhythm. No murmurs, gallops, rubs noted. Respiratory:  Lung sounds Normal.  Respirations nonlabored. The patient is not using accessory muscles with breathing. Current pulse ox 95% room air. Abdomen:  Soft, nontender, nondistended, normoactive bowel sounds. No masses noted. Musculoskeletal: No muscle tenderness. No joint tenderness. Currently wheelchair dependent. PV:  Peripheral pulses present. He  does not have any edema to his bilateral lower extremities. Roslynn Mutton Psychological: Mood stable. Affect pleasant. Speech normal rate and tone. ASSESSMENT AND PLAN:      Diagnosis Orders   1. COVID     2. Paroxysmal atrial fibrillation (HCC)     3. Combined systolic and diastolic heart failure, unspecified HF chronicity ([address]     4. Parkinson disease ([address]     5. Primary hypertension     6. Mixed hyperlipidemia     7. Coronary artery disease involving native coronary artery of native heart without angina pectoris          1. Respiratory status is stable. No hypoxic episodes or episodes of respiratory distress.   No use of accessory muscles in breathing. Current pulse ox 95% room air. 2. Patient is currently in a regular rhythm. He reports no heart palpitations that he is aware of. We will continue his sotalol as ordered. 3. No signs or symptoms of fluid overload. 4. Patient has not had any exacerbation of his symptoms. He will continue his current medication regimen. He will follow-up with his PCP and neurologist after discharge. 5. Blood pressure remained stable. He has not had any hypertensive or hypotensive episodes. 6. Most recent lipid panel total cholesterol 140, triglycerides 72, HDL 47, LDL 79. Continue atorvastatin as ordered. 7. Patient has not had any chest pain or chest discomfort. We will continue his aspirin, Plavix, and Mag-Ox as ordered. I have reviewed this resident's medication and treatment plan as well as most recent lab work. Patient will be discharged to his assisted living apartment with home health care, PT, OT, nursing, and aide. Nursing staff may call in a 2-week supply of medications to the pharmacy of the resident's choice. We will continue to follow him in assisted living if he chooses. Otherwise he will follow up with his PCP within 1 week of discharge. It was a pleasure following this resident while he was at [facility]. Greater than 30 minutes was spent in the discharge planning of this patient. No further changes will be made at this time. Return for 1-2 weeks with pcp. Please note this report is partially produced by using speech recognition hardware. It may contain errors related to the system, including grammar, punctuation and spelling as well as words and phrases that may seem inaccurate. For any questions or concerns feel free to contact me for clarification        Electronically Signed By: Tima Alexandra. Charmayne Hanly, CNP on 3/6/22   ______________________________  Tima Alexandra.  Baltazar LIU CNP

## 2022-03-16 PROBLEM — I49.5 SICK SINUS SYNDROME (HCC): Status: ACTIVE | Noted: 2022-03-16

## 2022-03-16 PROBLEM — I50.30 DIASTOLIC HEART FAILURE (HCC): Status: ACTIVE | Noted: 2022-03-16

## 2022-03-16 PROBLEM — R13.10 DYSPHAGIA: Status: ACTIVE | Noted: 2022-03-16

## 2022-03-17 ENCOUNTER — OFFICE VISIT (OUTPATIENT)
Dept: GERIATRIC MEDICINE | Age: 87
End: 2022-03-17
Payer: MEDICARE

## 2022-03-17 DIAGNOSIS — G20 PARKINSON DISEASE (HCC): Primary | ICD-10-CM

## 2022-03-17 PROCEDURE — 99309 SBSQ NF CARE MODERATE MDM 30: CPT | Performed by: INTERNAL MEDICINE

## 2022-03-17 NOTE — PROGRESS NOTES
Nursing Home:  [address] [place] [address]    The patient is being seen today for follow-up after recent admission to this facility for:  Chief Complaint   Patient presents with    Follow-Up from Hospital         SUBJECTIVE:  Patient being seen today for follow-up after recent admission to this facility with primary diagnosis of dysphagia, Parkinson's disease, sick sinus syndrome, presence of cardiac pacemaker, primary hypertension, CAD, diastolic heart failure, and A. fib. FAMILY AND SOCIAL HISTORY:  Refer to H&P. Past Medical History:   Diagnosis Date    Atrial fibrillation ([facility]     CAD (coronary artery disease)     Carpal tunnel syndrome     Cholesterol serum increased     Diverticulosis     HISTORY OF    Epistaxis     Gout     History of prostate cancer     Hyperlipidemia     Hypertension     Parkinson disease ([facility]      No family history on file. Social History     Socioeconomic History    Marital status:      Spouse name: Not on file    Number of children: Not on file    Years of education: Not on file    Highest education level: Not on file   Occupational History    Not on file   Tobacco Use    Smoking status: Never Smoker    Smokeless tobacco: Never Used   Substance and Sexual Activity    Alcohol use: No    Drug use: Not on file    Sexual activity: Not on file   Other Topics Concern    Not on file   Social History Narrative    Not on file     Social Determinants of Health     Financial Resource Strain:     Difficulty of Paying Living Expenses: Not on file   Food Insecurity:     Worried About Running Out of Food in the Last Year: Not on file    Bon of Food in the Last Year: Not on file   Transportation Needs:     Lack of Transportation (Medical): Not on file    Lack of Transportation (Non-Medical):  Not on file   Physical Activity:     Days of Exercise per Week: Not on file    Minutes of Exercise per Session: Not on file   Stress:     Feeling of Stress : Not on file   Social Connections:     Frequency of Communication with Friends and Family: Not on file    Frequency of Social Gatherings with Friends and Family: Not on file    Attends Evangelical Services: Not on file    Active Member of Clubs or Organizations: Not on file    Attends Club or Organization Meetings: Not on file    Marital Status: Not on file   Intimate Partner Violence:     Fear of Current or Ex-Partner: Not on file    Emotionally Abused: Not on file    Physically Abused: Not on file    Sexually Abused: Not on file   Housing Stability:     Unable to Pay for Housing in the Last Year: Not on file    Number of [place] in the Last Year: Not on file    Unstable Housing in the Last Year: Not on file     ALLERGIES:  Patient has no known allergies. MEDICATIONS:  Alendronate sodium tab 70 mg 1 tab p.o. every Wednesday, amantadine HCL tab 100 mg tab p.o. twice daily, ammonium lactate cream 12% applied to bilateral lower extremities topically twice daily, ASA enteric-coated tab delayed release 81 mg tab p.o. daily, atorvastatin calcium 40 mg tab p.o. daily, carbidopa-levodopa-entacapone tablet 12.5-50-2 100 mg tab p.o. 4 times daily, Plavix 75 mg tab p.o. daily, Dulcolax suppository 10 mg rectal suppository daily as needed, I-Lex tablet 1 tab p.o. daily, famotidine 20 mg tab p.o. twice daily, Mag-Ox 400 mg tab p.o. daily, milk of magnesia suspension 1200 mg per 15 mL give 30 mL p.o. daily as needed, multivitamin tablet 1 tablet p.o. daily, ropinirole HCL tab 0.5 mg tab give 2 tabs p.o. 4 times daily, sotalol HCl 80 mg tab of one half tab p.o. twice daily, vitamin C 500 mg p.o. daily, vitamin D3 50 MCG (2000 UT 1 tablet p.o. daily. REVIEW OF SYSTEMS:  Resident denies any headache, dizziness, blurred vision. He denies any fever, chills, sore throat. He denies any rashes, bruises, or open areas. He denies any chest pain, chest discomfort, or heart palpitations.   He denies any shortness of breath or cough. He denies any nausea, vomiting, or abdominal pain. He denies any urinary urgency, frequency, or dysuria. He denies any constipation or diarrhea. He states he is eating okay, sleeping okay, and he currently has no pain. PHYSICAL EXAMINATION:  Most recent vital signs: Blood pressure 149/91, temperature 97.1, heart rate 52, respirations 18, pulse ox 98% on room air, weight 138 pounds. Constitutional:  Resident is a 80 y.o. male frail, alert, pleasant, and cooperative with exam.  In no apparent distress. Integument:  Pink, warm, dry. No visible rashes, bruises, or open areas. HEENT:  Normocephalic, atraumatic. External ears appear to be within normal limits. Hard of hearing. Conjunctivae pink. Sclerae nonicteric. Mucous membranes pink, moist.  No oropharyngeal exudate. Neck:  Supple. No cervical or clavicular lymphadenopathy. No masses noted. Cardiovascular:  Heart rate regular rate and rhythm. No murmurs, gallops, rubs noted. Respiratory:  Lung sounds Normal.  Respirations nonlabored. The patient is not using accessory muscles with breathing. Abdomen:  Soft, nontender, nondistended, normoactive bowel sounds. No masses noted. Musculoskeletal: No muscle tenderness. No joint tenderness. Currently wheelchair dependent. PV:  Peripheral pulses present. He  does not have any edema to his bilateral lower extremities. Psychological: Mood stable. Affect pleasant. Speech normal rate and tone. ASSESSMENT AND PLAN:      Diagnosis Orders   1. Dysphagia, unspecified type     2. Parkinson disease ([address]     3. Sick sinus syndrome ([address]     4. Presence of cardiac pacemaker     5. Primary hypertension     6. Coronary artery disease involving native heart, unspecified vessel or lesion type, unspecified whether angina present     7. Diastolic heart failure, unspecified HF chronicity ([address]     8. Atrial fibrillation, unspecified type ([address]          1.  Patient is complaining of phlegm frequently he is working with speech therapy. He is on a puréed honey thick diet. He was recommended to have a PEG tube in the hospital after he failed his swallow evaluation, however he declined. 2. No exacerbation of his symptoms. We will continue his current medication regimen. 3. Patient has not had any increased fatigue dizziness, fainting, shortness of breath, increased confusion, bradycardia, palpitations or chest discomfort. 4. Patient will continue to follow with cardiology. 5. Blood pressure has been stable. He is currently not on any blood pressure medication. 6. Patient has not had any chest pain or chest discomfort. We will continue his ASA, Plavix, and Mag-Ox as ordered. 7. No signs or symptoms of fluid overload at the current time. 8. No episodes of chest pain, chest discomfort, or heart palpitations. We will continue his sotalol as ordered. He will continue to follow with cardiology. I have reviewed this resident's medication and treatment plan as well as most recent lab work. No further changes will be made at this time. Return in about 1 week (around 3/9/2022), or if symptoms worsen or fail to improve. Please note this report is partially produced by using speech recognition hardware. It may contain errors related to the system, including grammar, punctuation and spelling as well as words and phrases that may seem inaccurate. For any questions or concerns feel free to contact me for clarification        Electronically Signed By: Leonidas Jules. Sivan Arguelles CNP on 3/16/22   ______________________________  Leonidas LIU CNP

## 2022-03-18 ENCOUNTER — OFFICE VISIT (OUTPATIENT)
Dept: GERIATRIC MEDICINE | Age: 87
End: 2022-03-18
Payer: MEDICARE

## 2022-03-18 DIAGNOSIS — I50.30 DIASTOLIC HEART FAILURE, UNSPECIFIED HF CHRONICITY (HCC): Primary | ICD-10-CM

## 2022-03-18 PROCEDURE — 99308 SBSQ NF CARE LOW MDM 20: CPT | Performed by: NURSE PRACTITIONER

## 2022-03-19 NOTE — PROGRESS NOTES
Nursing Home:  [address] [place] [address]    The patient is being seen today for discharge:  Chief Complaint   Patient presents with    Other     discharge       SUBJECTIVE: Patient being seen today for discharge summary. Resident was recently admitted to this facility with primary diagnosis of dysphagia, Parkinson's disease, sick sinus syndrome, presence of cardiac pacemaker, primary hypertension, CAD, diastolic heart failure, and A. fib. He is working with PT, OT, and speech therapy. He did fail a modified barium swallow at the hospital was advised for PEG tube however patient declined. He has been working with speech therapy in hopes of advancing his diet. However his insurance cut his skilled benefit and I am seeing him for discharge home today. He is actively appealing the insurance company decision by may need to be discharged if he loses appeal.    FAMILY AND SOCIAL HISTORY:  Refer to H&P. Past Medical History:   Diagnosis Date    Atrial fibrillation ([facility]     CAD (coronary artery disease)     Carpal tunnel syndrome     Cholesterol serum increased     Diverticulosis     HISTORY OF    Epistaxis     Gout     History of prostate cancer     Hyperlipidemia     Hypertension     Parkinson disease ([facility]      No family history on file. Social History     Socioeconomic History    Marital status:       Spouse name: Not on file    Number of children: Not on file    Years of education: Not on file    Highest education level: Not on file   Occupational History    Not on file   Tobacco Use    Smoking status: Never Smoker    Smokeless tobacco: Never Used   Substance and Sexual Activity    Alcohol use: No    Drug use: Not on file    Sexual activity: Not on file   Other Topics Concern    Not on file   Social History Narrative    Not on file     Social Determinants of Health     Financial Resource Strain:     Difficulty of Paying Living Expenses: Not on file   Food Insecurity:  Worried About Running Out of Food in the Last Year: Not on file    Bon of Food in the Last Year: Not on file   Transportation Needs:     Lack of Transportation (Medical): Not on file    Lack of Transportation (Non-Medical): Not on file   Physical Activity:     Days of Exercise per Week: Not on file    Minutes of Exercise per Session: Not on file   Stress:     Feeling of Stress : Not on file   Social Connections:     Frequency of Communication with Friends and Family: Not on file    Frequency of Social Gatherings with Friends and Family: Not on file    Attends Nondenominational Services: Not on file    Active Member of [address] [place] [address] [employer] or Organizations: Not on file    Attends Club or Organization Meetings: Not on file    Marital Status: Not on file   Intimate Partner Violence:     Fear of Current or Ex-Partner: Not on file    Emotionally Abused: Not on file    Physically Abused: Not on file    Sexually Abused: Not on file   Housing Stability:     Unable to Pay for Housing in the Last Year: Not on file    Number of [place] in the Last Year: Not on file    Unstable Housing in the Last Year: Not on file     ALLERGIES:  Patient has no known allergies.     MEDICATIONS:  Alendronate sodium tab 70 mg 1 tab p.o. every Wednesday, amantadine HCL tab 100 mg tab p.o. twice daily, ammonium lactate cream 12% applied to bilateral lower extremities topically twice daily, ASA enteric-coated tab delayed release 81 mg tab p.o. daily, atorvastatin calcium 40 mg tab p.o. daily, carbidopa-levodopa-entacapone tablet 12.5-50-2 100 mg tab p.o. 4 times daily, Plavix 75 mg tab p.o. daily, Dulcolax suppository 10 mg rectal suppository daily as needed, I-Lex tablet 1 tab p.o. daily, famotidine 20 mg tab p.o. twice daily, Mag-Ox 400 mg tab p.o. daily, milk of magnesia suspension 1200 mg per 15 mL give 30 mL p.o. daily as needed, multivitamin tablet 1 tablet p.o. daily, ropinirole HCL tab 0.5 mg tab give 2 tabs p.o. 4 times daily, sotalol HCl 80 mg tab of one half tab p.o. twice daily, vitamin C 500 mg p.o. daily, vitamin D3 50 MCG (2000 UT 1 tablet p.o. daily. REVIEW OF SYSTEMS:  Resident denies any headache, dizziness, blurred vision. He denies any fever, chills, sore throat. He denies any rashes, bruises, or open areas. He denies any chest pain, chest discomfort, or heart palpitations. He denies any shortness of breath or cough. He denies any nausea, vomiting, or abdominal pain. He denies any urinary urgency, frequency, or dysuria. He denies any constipation or diarrhea. He states he is eating okay, sleeping okay, and he currently has no pain. He states he is working with therapy and feels he is making progress. PHYSICAL EXAMINATION:  Most recent vital signs: Blood pressure 121/68, temperature 97.6, heart rate 60, respirations 18, pulse ox 97% on room air, weight 138 pounds. Constitutional:  Resident is a 80 y.o. male Frail, alert, pleasant, and cooperative with exam.  In no apparent distress. Integument:  Pink, warm, dry. No visible rashes, bruises, or open areas. HEENT:  Normocephalic, atraumatic. External ears appear to be within normal limits. Hard of hearing. Conjunctivae pink. Sclerae nonicteric. Mucous membranes pink, moist.  No oropharyngeal exudate. Neck:  Supple. No cervical or clavicular lymphadenopathy. No masses noted  Cardiovascular:  Heart rate regular rate and rhythm. No murmurs, gallops, rubs noted. Respiratory:  Lung sounds Normal.  Respirations nonlabored. The patient is not using accessory muscles with breathing. Currently pulse oxing 97% on room air. Abdomen:  Soft, nontender, nondistended, normoactive bowel sounds. No masses noted. Musculoskeletal: No muscle tenderness. No joint tenderness. Currently wheelchair dependent. PV:  Peripheral pulses present. He  does not have any edema to his bilateral lower extremities. Psychological: Mood stable. Affect pleasant.  Speech

## 2022-03-22 ENCOUNTER — OFFICE VISIT (OUTPATIENT)
Dept: GERIATRIC MEDICINE | Age: 87
End: 2022-03-22
Payer: MEDICARE

## 2022-03-22 DIAGNOSIS — R13.10 DYSPHAGIA, UNSPECIFIED TYPE: Primary | ICD-10-CM

## 2022-03-22 PROCEDURE — 99309 SBSQ NF CARE MODERATE MDM 30: CPT | Performed by: NURSE PRACTITIONER

## 2022-03-23 LAB
ANION GAP SERPL CALCULATED.3IONS-SCNC: 8 MEQ/L (ref 9–15)
BUN BLDV-MCNC: 12 MG/DL (ref 8–23)
CALCIUM SERPL-MCNC: 10.3 MG/DL (ref 8.5–9.9)
CHLORIDE BLD-SCNC: 99 MEQ/L (ref 95–107)
CO2: 29 MEQ/L (ref 20–31)
CREAT SERPL-MCNC: 0.8 MG/DL (ref 0.7–1.2)
GFR AFRICAN AMERICAN: >60
GFR NON-AFRICAN AMERICAN: >60
GLUCOSE BLD-MCNC: 90 MG/DL (ref 70–99)
HCT VFR BLD CALC: 39.9 % (ref 42–52)
HEMOGLOBIN: 13.1 G/DL (ref 14–18)
MCH RBC QN AUTO: 29.3 PG (ref 27–31.3)
MCHC RBC AUTO-ENTMCNC: 32.8 % (ref 33–37)
MCV RBC AUTO: 89.3 FL (ref 80–100)
PDW BLD-RTO: 16.6 % (ref 11.5–14.5)
PLATELET # BLD: 129 K/UL (ref 130–400)
POTASSIUM SERPL-SCNC: 3.9 MEQ/L (ref 3.4–4.9)
RBC # BLD: 4.46 M/UL (ref 4.7–6.1)
SODIUM BLD-SCNC: 136 MEQ/L (ref 135–144)
TSH SERPL DL<=0.05 MIU/L-ACNC: 4.34 UIU/ML (ref 0.44–3.86)
WBC # BLD: 6.9 K/UL (ref 4.8–10.8)

## 2022-03-27 NOTE — PROGRESS NOTES
Patient Name: Cesia Parmar  YOB: 1929  Medical Record Number: [id number]      History of Present Illness: This 80-year-old gentleman seen his room patient recently hospitalized had a flu COVID-19 patient has been functionally declining. Patient has been stabilized and hospitalization patient transferred here for ongoing course of care. Patient is pain-free patient is functionally globally weak beyond his baseline. Transferred here for ongoing course of care goal\functional status. Patient's Rester status stable has reviewed hospital records imaging port consult notes. Review of Systems   Unable to perform ROS: Mental status change   All other systems reviewed and are negative. Review of Systems: All 14 review of systems negative other than as stated above    Social History     Tobacco Use    Smoking status: Never Smoker    Smokeless tobacco: Never Used   Substance Use Topics    Alcohol use: No    Drug use: Not on file         Past Medical History:   Diagnosis Date    Atrial fibrillation ([facility]     CAD (coronary artery disease)     Carpal tunnel syndrome     Cholesterol serum increased     Diverticulosis     HISTORY OF    Epistaxis     Gout     History of prostate cancer     Hyperlipidemia     Hypertension     Parkinson disease ([facility]            Past Surgical History:   Procedure Laterality Date    AORTIC VALVE SURGERY  06/08/2017    DR. LEVINE    CARDIAC CATHETERIZATION  05/08/2017    DR. MASON    COLONOSCOPY  1/30/09    DR ALVARADO    COLONOSCOPY  04/07/14      [address] [place] [address]      UPPER GASTROINTESTINAL ENDOSCOPY  1/30/09    DR Eugene Verma         Current Outpatient Medications on File Prior to Visit   Medication Sig Dispense Refill    alendronate (FOSAMAX) 70 MG tablet Take 70 mg by mouth every 7 days Indications: Osteoporosis Wed      Amantadine (SYMMETREL) 100 MG TABS tablet Take 100 mg by mouth 2 times daily Indications: Parkinson's Disease  ammonium lactate (AMLACTIN) 12 % cream Apply topically in the morning and at bedtime Indications: Dryness Confined to a Specific area of the Body      aspirin 81 MG EC tablet Take 81 mg by mouth daily Indications: Preventative Treatment      atorvastatin (LIPITOR) 40 MG tablet Take 40 mg by mouth daily Indications: High Amount of Fats in the Blood      carbidopa-levodopa-entacapone (STALEVO 50) 12.5- MG TABS per tablet Take 1 tablet by mouth 4 times daily Indications: Parkinson's Disease      clopidogrel (PLAVIX) 75 MG tablet Take 75 mg by mouth daily Indications: Atrial Fibrillation      Multiple Vitamins-Minerals (EYE-VITES) TABS Take 1 tablet by mouth daily Indications: Nutritional Support      famotidine (PEPCID) 20 MG tablet Take 20 mg by mouth 2 times daily Indications: Nutritional Support      magnesium oxide (MAG-OX) 400 MG tablet Take 400 mg by mouth daily Indications: Nutritional Support      Multiple Vitamin (MULTIVITAMIN ADULT) TABS Take 1 tablet by mouth daily Indications: Nutritional Support      ascorbic acid (VITAMIN C) 500 MG tablet Take 500 mg by mouth daily Indications: 2019 Novel Coronavirus, Nutritional Support       sotalol (BETAPACE) 80 MG tablet Take 40 mg by mouth 2 times daily Indications: High Blood Pressure Disorder       rOPINIRole (REQUIP) 1 MG tablet Take 1 mg by mouth 4 times daily Indications: Parkinson's Disease       Cholecalciferol (VITAMIN D3) 50 MCG (2000 UT) TABS Take 2,000 Units by mouth daily Indications: Nutritional Support       [DISCONTINUED] darifenacin (ENABLEX) 15 MG SR tablet Take 1 tablet by mouth daily. 90 tablet 3     Current Facility-Administered Medications on File Prior to Visit   Medication Dose Route Frequency Provider Last Rate Last Admin    triamcinolone acetonide (KENALOG-40) injection 80 mg  80 mg IntraMUSCular Once Jose Daniel Ricardo MD           No Known Allergies      No family history on file.       Physical Exam:      Physical Exam  Vitals and nursing note reviewed. Constitutional:       Appearance: Normal appearance. He is normal weight. HENT:      Head: Normocephalic. Nose: Nose normal.      Mouth/Throat:      Mouth: Mucous membranes are dry. Eyes:      Extraocular Movements: Extraocular movements intact. Cardiovascular:      Rate and Rhythm: Regular rhythm. Pulses: Normal pulses. Pulmonary:      Effort: Pulmonary effort is normal.      Breath sounds: No rhonchi. Abdominal:      General: Bowel sounds are normal.      Palpations: Abdomen is soft. Tenderness: There is no abdominal tenderness. Musculoskeletal:         General: No swelling. Normal range of motion. Cervical back: Normal range of motion and neck supple. Skin:     General: Skin is warm. Findings: Bruising present. Neurological:      Motor: Weakness present. Psychiatric:         Mood and Affect: Mood normal.         There were no vitals taken for this visit.       .   Lab Results   Component Value Date    WBC 6.9 03/23/2022    HGB 13.1 (L) 03/23/2022    HCT 39.9 (L) 03/23/2022    MCV 89.3 03/23/2022     (L) 03/23/2022     Lab Results   Component Value Date     03/23/2022    K 3.9 03/23/2022    CL 99 03/23/2022    CO2 29 03/23/2022    BUN 12 03/23/2022    CREATININE 0.80 03/23/2022    GLUCOSE 90 03/23/2022    GLUCOSE 97 06/29/2018    CALCIUM 10.3 03/23/2022                ASSESSMENT:  Patient Active Problem List   Diagnosis    Carpal tunnel syndrome    Atrial fibrillation    Hyperlipidemia    Parkinson disease ([facility]    Coronary artery disease involving native heart    Primary hypertension    Idiopathic chronic gout of multiple sites without tophus    Mixed hyperlipidemia    Constipation    Astigmatism    Vitreous degeneration    Dry eye syndrome    Subjective tinnitus    Deafness, sensorineural    Basal cell papilloma    Error, refractive, myopia    Artificial lens present    Inflamed seborrheic keratosis  Hypertropia    Binocular vision disorder with diplopia    Chronic rhinitis    Presence of cardiac pacemaker    Pacemaker    Hip pain    COVID    Mild protein-calorie malnutrition (HCC)    Muscle weakness    Dysphagia    Sick sinus syndrome (HCC)    Diastolic heart failure (HCC)         PLAN:   Diagnosis Orders   1. COVID     2. Parkinson disease ([facility]     3. Mild protein-calorie malnutrition ([facility]     4. Primary hypertension     5. Chronic atrial fibrillation (HCC)         Continue pressure treatments rest for isolation. Continue treatment support. Continue with functional decline prevention. Monitoring systolic pressure monitor blood pressure.   Repeat A1c pending follow-up white count pending

## 2022-03-28 LAB
BILIRUBIN URINE: NEGATIVE
BLOOD, URINE: NEGATIVE
CLARITY: CLEAR
COLOR: ABNORMAL
GLUCOSE URINE: NEGATIVE MG/DL
KETONES, URINE: ABNORMAL MG/DL
LEUKOCYTE ESTERASE, URINE: NEGATIVE
NITRITE, URINE: NEGATIVE
PH UA: 6.5 (ref 5–9)
PROTEIN UA: ABNORMAL MG/DL
SPECIFIC GRAVITY UA: 1.02 (ref 1–1.03)
UROBILINOGEN, URINE: 0.2 E.U./DL

## 2022-03-30 LAB — URINE CULTURE, ROUTINE: NORMAL

## 2022-03-31 LAB
FOLATE: >23.3 NG/ML
TSH SERPL DL<=0.05 MIU/L-ACNC: 2.44 MIU/L (ref 0.44–3.9)
VITAMIN B-12: 1069 PG/ML (ref 211–911)
VITAMIN D 25-HYDROXY: 22 NG/ML

## 2022-03-31 NOTE — PROGRESS NOTES
[facility]  3/18/2022    Krishna Mckeon  is a 80 y.o. in the  being seen for a f/u of   Chief Complaint   Patient presents with    Congestive Heart Failure       HPI   Patient being seen today for acute visit for diastolic heart failure. Past Medical History:   Diagnosis Date    Atrial fibrillation ([facility]     CAD (coronary artery disease)     Carpal tunnel syndrome     Cholesterol serum increased     Diverticulosis     HISTORY OF    Epistaxis     Gout     History of prostate cancer     Hyperlipidemia     Hypertension     Parkinson disease ([facility] [address] [facility]      Past Surgical History:   Procedure Laterality Date    AORTIC VALVE SURGERY  06/08/2017    DR. LEVINE    CARDIAC CATHETERIZATION  05/08/2017    DR. MASON    COLONOSCOPY  1/30/09    DR ALVARADO    COLONOSCOPY  04/07/14    DR. [facility]pos [facility]      UPPER GASTROINTESTINAL ENDOSCOPY  1/30/09    DR ALVARADO     No family history on file. Social History     Socioeconomic History    Marital status:      Spouse name: Not on file    Number of children: Not on file    Years of education: Not on file    Highest education level: Not on file   Occupational History    Not on file   Tobacco Use    Smoking status: Never Smoker    Smokeless tobacco: Never Used   Substance and Sexual Activity    Alcohol use: No    Drug use: Not on file    Sexual activity: Not on file   Other Topics Concern    Not on file   Social History Narrative    Not on file     Social Determinants of Health     Financial Resource Strain:     Difficulty of Paying Living Expenses: Not on file   Food Insecurity:     Worried About Running Out of Food in the Last Year: Not on file    Bon of Food in the Last Year: Not on file   Transportation Needs:     Lack of Transportation (Medical): Not on file    Lack of Transportation (Non-Medical):  Not on file   Physical Activity:     Days of Exercise per Week: Not on file    Minutes of Exercise per Session: Not on file   Stress:     Feeling of Stress : Not on file   Social Connections:     Frequency of Communication with Friends and Family: Not on file    Frequency of Social Gatherings with Friends and Family: Not on file    Attends [employer] Services: Not on file    Active Member of Clubs or Organizations: Not on file    Attends Club or Organization Meetings: Not on file    Marital Status: Not on file   Intimate Partner Violence:     Fear of Current or Ex-Partner: Not on file    Emotionally Abused: Not on file    Physically Abused: Not on file    Sexually Abused: Not on file   Housing Stability:     Unable to Pay for Housing in the Last Year: Not on file    Number of [place] in the Last Year: Not on file    Unstable Housing in the Last Year: Not on file       Allergies: Patient has no known allergies. NF MEDICATIONS REVIEWED    ROS:  See HPI  Constitutional: There are no reports of behavioral issues, change in appetite, fever, or weakness. No gross bleeding issues. Respiratory: denies SOB, dyspnea, dyspnea on exertion,   Cardiovascular: denies CP, lightheadedness,   GI: No reports of change of bowel habits, no N/V/D/C. : no reports of change in bladder habits  Extremities: No reports of pain issues, no edema    Physical exam:   Blood pressure 123/64, temp 98.0, heart rate 72, respirations 18, pulse ox 95% room air, weight 133.2 pounds. Constitutional: Elderly frail male. In no apparent distress. HEENT: normocephalic, hard of hearing, conjunctiva pink, sclera nonicteric, MMM, no cyanosis. NO neck mass visualized   Cardiovascular: Regular rate  Respiratory: unlabored respirations, no accessory muscle use noted  GI: abdomen ND  : no bladder tenderness  Extremities: no edema,  Psych: pleasant and able to follow simple commands, normal thought content, speech clear    ASSESSMENT:     Diagnosis Orders   1.  Diastolic heart failure, unspecified HF chronicity ([facility]         PLAN: Agrees with POC     No signs or symptoms of fluid overload. Patient denies any increased shortness of breath congestion or chest discomfort. He denies any edema. He denies elevating the head of his bed when he sleeps or using any additional pillows. Return in about 1 week (around 3/25/2022), or if symptoms worsen or fail to improve. Pertinent POC, labs, have been reviewed, continue same. Encourage fluids and good nutrition. Stress fall prevention strategies. Nursing to notify Pt/POA for agreement to POC     Please note this report is partially produced by using speech recognition hardware. It may contain errors related to the system, including grammar, punctuation and spelling as well as words and phrases that may seem inaccurate. For any questions or concerns feel free to contact me for clarification           ________________________    Sinan Shukla. Emerson Child APRN, [address]  Office [phone]  Fax [phone]      Please note this report is partially produced by using speech recognition hardware. It may contain errors related to the system, including grammar, punctuation and spelling as well as words and phrases that may seem inaccurate.   For any questions or concerns feel free to contact me for clarification

## 2022-04-01 NOTE — PROGRESS NOTES
SUBJECTIVE:  This 19-year-old gentleman seen in his room for follow-up visit for his Parkinson's weakness fluidotherapy been intimately episodes of prior falls without recent chest palpitation or change in her his bowel bladder habit patient recent confusional episode no acute bleed diathesis. Patient has been functionally weak. ROS: Limited cognition  The rest of the 14 point ROS negative    PHYSICAL EXAM: VSS per facility record /60 heart rate 66 temperature 97.9  Masklike facies pupils are small oral mucosa dry chest showed no crackles no wheezing cardiovascular showed a regular rate abdomen soft nontender extensional trace terrestrial pulse skin shows no rash    ASSESSMENT & PLAN:   Diagnosis Orders   1. Parkinson disease ([facility]         Continue supportive care ropinirole and Sinemet reorientation as able. Continue course of felt precautions. Goal maximize his functional status monitor for new hallucinations will follow clinically. Past Medical History:   Diagnosis Date    Atrial fibrillation ([facility]     CAD (coronary artery disease)     Carpal tunnel syndrome     Cholesterol serum increased     Diverticulosis     HISTORY OF    Epistaxis     Gout     History of prostate cancer     Hyperlipidemia     Hypertension     Parkinson disease ([facility]          Past Surgical History:   Procedure Laterality Date    AORTIC VALVE SURGERY  06/08/2017    DR. LEVINE    CARDIAC CATHETERIZATION  05/08/2017    DR. MASON    COLONOSCOPY  1/30/09    DR ALVARADO    COLONOSCOPY  04/07/14      [address] [place] [address]      UPPER GASTROINTESTINAL ENDOSCOPY  1/30/09    DR Sherif Cueva         Current Outpatient Medications on File Prior to Visit   Medication Sig Dispense Refill    alendronate (FOSAMAX) 70 MG tablet Take 70 mg by mouth every 7 days Indications: Osteoporosis Wed      Amantadine (SYMMETREL) 100 MG TABS tablet Take 100 mg by mouth 2 times daily Indications: Parkinson's Disease      ammonium lactate (AMLACTIN) 12 % cream Apply topically in the morning and at bedtime Indications: Dryness Confined to a Specific area of the Body      aspirin 81 MG EC tablet Take 81 mg by mouth daily Indications: Preventative Treatment      atorvastatin (LIPITOR) 40 MG tablet Take 40 mg by mouth daily Indications: High Amount of Fats in the Blood      carbidopa-levodopa-entacapone (STALEVO 50) 12.5- MG TABS per tablet Take 1 tablet by mouth 4 times daily Indications: Parkinson's Disease      clopidogrel (PLAVIX) 75 MG tablet Take 75 mg by mouth daily Indications: Atrial Fibrillation      Multiple Vitamins-Minerals (EYE-VITES) TABS Take 1 tablet by mouth daily Indications: Nutritional Support      famotidine (PEPCID) 20 MG tablet Take 20 mg by mouth 2 times daily Indications: Nutritional Support      magnesium oxide (MAG-OX) 400 MG tablet Take 400 mg by mouth daily Indications: Nutritional Support      Multiple Vitamin (MULTIVITAMIN ADULT) TABS Take 1 tablet by mouth daily Indications: Nutritional Support      ascorbic acid (VITAMIN C) 500 MG tablet Take 500 mg by mouth daily Indications: 2019 Novel Coronavirus, Nutritional Support       sotalol (BETAPACE) 80 MG tablet Take 40 mg by mouth 2 times daily Indications: High Blood Pressure Disorder       rOPINIRole (REQUIP) 1 MG tablet Take 1 mg by mouth 4 times daily Indications: Parkinson's Disease       Cholecalciferol (VITAMIN D3) 50 MCG (2000 UT) TABS Take 2,000 Units by mouth daily Indications: Nutritional Support       [DISCONTINUED] darifenacin (ENABLEX) 15 MG SR tablet Take 1 tablet by mouth daily. 90 tablet 3     Current Facility-Administered Medications on File Prior to Visit   Medication Dose Route Frequency Provider Last Rate Last Admin    triamcinolone acetonide (KENALOG-40) injection 80 mg  80 mg IntraMUSCular Once Zack Dillon MD             No family history on file.     Social History     Socioeconomic History    Marital status:      Spouse name: Not on file    Number of children: Not on file    Years of education: Not on file    Highest education level: Not on file   Occupational History    Not on file   Tobacco Use    Smoking status: Never Smoker    Smokeless tobacco: Never Used   Substance and Sexual Activity    Alcohol use: No    Drug use: Not on file    Sexual activity: Not on file   Other Topics Concern    Not on file   Social History Narrative    Not on file     Social Determinants of Health     Financial Resource Strain:     Difficulty of Paying Living Expenses: Not on file   Food Insecurity:     Worried About Running Out of Food in the Last Year: Not on file    Bon of Food in the Last Year: Not on file   Transportation Needs:     Lack of Transportation (Medical): Not on file    Lack of Transportation (Non-Medical):  Not on file   Physical Activity:     Days of Exercise per Week: Not on file    Minutes of Exercise per Session: Not on file   Stress:     Feeling of Stress : Not on file   Social Connections:     Frequency of Communication with Friends and Family: Not on file    Frequency of Social Gatherings with Friends and Family: Not on file    Attends Druze Services: Not on file    Active Member of [address] [place] [address] or Organizations: Not on file    Attends Club or Organization Meetings: Not on file    Marital Status: Not on file   Intimate Partner Violence:     Fear of Current or Ex-Partner: Not on file    Emotionally Abused: Not on file    Physically Abused: Not on file    Sexually Abused: Not on file   Housing Stability:     Unable to Pay for Housing in the Last Year: Not on file    Number of [place] in the Last Year: Not on file    Unstable Housing in the Last Year: Not on file         Lab Results   Component Value Date    LABA1C 6.0 (H) 04/22/2021     No results found for: EAG    Lab Results   Component Value Date     03/31/2022    K 3.7 03/31/2022     03/31/2022    CO2 29 03/23/2022    BUN 12 03/23/2022    CREATININE 0.85 03/31/2022    GLUCOSE 110 03/31/2022    CALCIUM 9.7 03/31/2022        Lab Results   Component Value Date    CHOL 140 04/22/2021    CHOL 138 05/22/2018    CHOL 183 03/30/2018     Lab Results   Component Value Date    TRIG 72 04/22/2021    TRIG 174 (A) 05/22/2018    TRIG 111 03/30/2018     Lab Results   Component Value Date    HDL 47 04/22/2021    HDL 41 05/22/2018    HDL 48 03/30/2018     Lab Results   Component Value Date    LDLCALC 79 04/22/2021    LDLCALC 62 05/22/2018    LDLCALC 113 03/30/2018     Lab Results   Component Value Date    LABVLDL 35 (A) 05/22/2018    LABVLDL 22 03/30/2018     Lab Results   Component Value Date    CHOLHDLRATIO 3.4 05/22/2018    CHOLHDLRATIO 3.8 03/30/2018    CHOLHDLRATIO 3.8 02/21/2012       Lab Results   Component Value Date    TSH 2.44 03/31/2022       Lab Results   Component Value Date    WBC 10.8 03/31/2022    HGB 11.4 (L) 03/31/2022    HCT 36.6 (L) 03/31/2022    MCV 95 03/31/2022     (L) 03/31/2022       Please note orders entered on site at facility after discussion with appropriate facility nursing/therapy/ / nutritional staff. Current longstanding medical problems and acute medical issues addressed with staff. Available data and data elements in on site paper chart reviewed and analyzed. Current external consultant notes reviewed in on site chart. Ordered laboratory testing and imaging will be reviewed when available.

## 2022-04-02 NOTE — PROGRESS NOTES
[facility]  3/22/2022    Vu Garcia  is a 80 y.o. in the  being seen for a acute visit for   Chief Complaint   Patient presents with    Dysphagia       HPI patient being seen today for acute visit for dysphagia. They are eating and drinking at their baseline per nursing. They have had no recent falls with injuries. They are not complaining of any un addressed pain issues. Have had no recent choking or dysphagia issues. NO agitation or unusual mental behavioral issues. Past Medical History:   Diagnosis Date    Atrial fibrillation ([facility]     CAD (coronary artery disease)     Carpal tunnel syndrome     Cholesterol serum increased     Diverticulosis     HISTORY OF    Epistaxis     Gout     History of prostate cancer     Hyperlipidemia     Hypertension     Parkinson disease ([facility]      Past Surgical History:   Procedure Laterality Date    AORTIC VALVE SURGERY  06/08/2017    DR. LEVINE    CARDIAC CATHETERIZATION  05/08/2017    DR. MASON    COLONOSCOPY  1/30/09    DR ALVARADO    [facility]      UPPER GASTROINTESTINAL ENDOSCOPY  1/30/09    DR ALVARADO     No family history on file. Social History     Socioeconomic History    Marital status:       Spouse name: Not on file    Number of children: Not on file    Years of education: Not on file    Highest education level: Not on file   Occupational History    Not on file   Tobacco Use    Smoking status: Never Smoker    Smokeless tobacco: Never Used   Substance and Sexual Activity    Alcohol use: No    Drug use: Not on file    Sexual activity: Not on file   Other Topics Concern    Not on file   Social History Narrative    Not on file     Social Determinants of Health     Financial Resource Strain:     Difficulty of Paying Living Expenses: Not on file   Food Insecurity:     Worried About Running Out of Food in the Last Year: Not on file    [address] in the Last Year: Not on file   Transportation Needs:     Lack of Transportation (Medical): Not on file    Lack of Transportation (Non-Medical): Not on file   Physical Activity:     Days of Exercise per Week: Not on file    Minutes of Exercise per Session: Not on file   Stress:     Feeling of Stress : Not on file   Social Connections:     Frequency of Communication with Friends and Family: Not on file    Frequency of Social Gatherings with Friends and Family: Not on file    Attends Holiness Services: Not on file    Active Member of [address] [place] [address] [employer] or Organizations: Not on file    Attends Club or Organization Meetings: Not on file    Marital Status: Not on file   Intimate Partner Violence:     Fear of Current or Ex-Partner: Not on file    Emotionally Abused: Not on file    Physically Abused: Not on file    Sexually Abused: Not on file   Housing Stability:     Unable to Pay for Housing in the Last Year: Not on file    Number of [place] in the Last Year: Not on file    Unstable Housing in the Last Year: Not on file       Allergies: Patient has no known allergies. NF MEDICATIONS REVIEWED    ROS:   Constitutional: There are no reports of behavioral issues, change in appetite, fever, or increased weakness. No bleeding issues. Nurse concerned patient not eating as well as prior with current dietary restrictions. Respiratory: denies SOB, dyspnea, dyspnea on exertion  Cardiovascular: denies CP, lightheadedness, palpitations, PND, orthopnea, or claudication. GI: No N/V/D. : no reports of dysuria, frequency or urgency  Extremities: No reports of pain issues, edema  Psych: at baseline confusion     Physical exam:   Blood pressure 126/71, temperature 97.9, heart rate 76, respirations 18, spo2 96%, weight 133.2lbs. Constitutional: Awake and alert sitting up in wheelchiar , general weakness  HEENT: Normocephalic, [place], conjunctiva pink, sclera non icteric,  buccal mucosa pink and moist  Speech clear.    NECK: - no cervical or clavicular lymphadenopathy, euthyroid, no mass visualized  Cardiovascular: Regular rate, and rhythm. No murmurs, gallops or rubs noted. Respiratory: LCTA, even unlabored respirations, no accessory muscle use noted  GI: abdomen NT, +BS x 4 Q, ND  : no suprapubic tenderness  Extremities: no ankle edema, PPP  SKELETAL: no redness, or swelling over joints. Limited ROM but spontaneous movement x 4   Psych: pleasantly confused, repetitive ,good judgement, normal thought content  SKIN: no gross skin lesions noted on visualized skin, warm and dry    ASSESSMENT:     Diagnosis Orders   1. Dysphagia, unspecified type         PLAN:  Pt/POA agrees with POC   BMP, CBC with diff, TSH in am    Please note this report is partially produced by using speech recognition hardware. It may contain errors related to the system, including grammar, punctuation and spelling as well as words and phrases that may seem inaccurate. For any questions or concerns feel free to contact me for clarification           ________________________    Amita Cain.  Paulo Puri [facility], [address]  Office [phone]  Fax [phone]

## 2022-04-14 ENCOUNTER — OFFICE VISIT (OUTPATIENT)
Dept: GERIATRIC MEDICINE | Age: 87
End: 2022-04-14
Payer: MEDICARE

## 2022-04-14 DIAGNOSIS — M62.81 MUSCLE WEAKNESS: ICD-10-CM

## 2022-04-14 DIAGNOSIS — I48.20 CHRONIC ATRIAL FIBRILLATION (HCC): ICD-10-CM

## 2022-04-14 DIAGNOSIS — G20 PARKINSON DISEASE (HCC): Primary | ICD-10-CM

## 2022-04-14 DIAGNOSIS — I10 PRIMARY HYPERTENSION: ICD-10-CM

## 2022-04-14 PROCEDURE — 99305 1ST NF CARE MODERATE MDM 35: CPT | Performed by: INTERNAL MEDICINE

## 2022-04-20 ENCOUNTER — OFFICE VISIT (OUTPATIENT)
Dept: GERIATRIC MEDICINE | Age: 87
End: 2022-04-20
Payer: MEDICARE

## 2022-04-20 DIAGNOSIS — G20 PARKINSON DISEASE (HCC): Primary | ICD-10-CM

## 2022-04-20 DIAGNOSIS — Z95.0 PACEMAKER: ICD-10-CM

## 2022-04-20 DIAGNOSIS — I49.5 SICK SINUS SYNDROME (HCC): ICD-10-CM

## 2022-04-20 DIAGNOSIS — E44.1 MILD PROTEIN-CALORIE MALNUTRITION (HCC): ICD-10-CM

## 2022-04-20 DIAGNOSIS — I50.30 DIASTOLIC HEART FAILURE, UNSPECIFIED HF CHRONICITY (HCC): ICD-10-CM

## 2022-04-20 DIAGNOSIS — I48.20 CHRONIC ATRIAL FIBRILLATION (HCC): ICD-10-CM

## 2022-04-20 DIAGNOSIS — R13.10 DYSPHAGIA, UNSPECIFIED TYPE: ICD-10-CM

## 2022-04-20 PROCEDURE — 99309 SBSQ NF CARE MODERATE MDM 30: CPT | Performed by: NURSE PRACTITIONER

## 2022-04-29 NOTE — PROGRESS NOTES
Nursing Home:  [address] [place] [address]    The patient is being seen today for follow-up after recent admission to this facility for:  Chief Complaint   Patient presents with    Follow-Up from Hospital         SUBJECTIVE:  Patient being seen today for readmission to this facility with primary diagnosis of Parkinson's disease, dysphagia, diastolic heart failure, sick sinus syndrome, status post pacemaker placement, chronic A. fib, and mild protein calorie malnutrition. Patient is now under the care of hospice services. FAMILY AND SOCIAL HISTORY:  Refer to H&P. Past Medical History:   Diagnosis Date    At[facility] fibrillation ([facility]     CAD (coronary artery disease)     Carpal tunnel syndrome     Cholesterol serum increased     Diverticulosis     HISTORY OF    Epistaxis     Gout     History of prostate cancer     Hyperlipidemia     Hypertension     [facility]      No family history on file. Social History     Socioeconomic History    Marital status:      Spouse name: Not on file    Number of children: Not on file    Years of education: Not on file    Highest education level: Not on file   Occupational History    Not on file   Tobacco Use    Smoking status: Never Smoker    Smokeless tobacco: Never Used   Substance and Sexual Activity    Alcohol use: No    Drug use: Not on file    Sexual activity: Not on file   Other Topics Concern    Not on file   Social History Narrative    Not on file     Social Determinants of Health     Financial Resource Strain:     Difficulty of Paying Living Expenses: Not on file   Food Insecurity:     Worried About Running Out of Food in the Last Year: Not on file    Bon of Food in the Last Year: Not on file   Transportation Needs:     Lack of Transportation (Medical): Not on file    Lack of Transportation (Non-Medical):  Not on file   Physical Activity:     Days of Exercise per Week: Not on file    Minutes of Exercise per Session: Not on file   Stress:     Feeling of Stress : Not on file   Social Connections:     Frequency of Communication with Friends and Family: Not on file    Frequency of Social Gatherings with Friends and Family: Not on file    Attends Advent Services: Not on file    Active Member of Clubs or Organizations: Not on file    Attends Club or Organization Meetings: Not on file    Marital Status: Not on file   Intimate Partner Violence:     Fear of Current or Ex-Partner: Not on file    Emotionally Abused: Not on file    Physically Abused: Not on file    Sexually Abused: Not on file   Housing Stability:     Unable to Pay for Housing in the Last Year: Not on file    Number of [place] in the Last Year: Not on file    Unstable Housing in the Last Year: Not on file     ALLERGIES:  Patient has no known allergies. MEDICATIONS: Alendronate sodium tablet 70 mg tab p.o. every Wednesday, amantadine 100 mg tab p.o. twice daily, ammonium lactate cream 12% apply to BLE topically twice daily, ASA enteric-coated delayed release tab 81 mg tab p.o. daily, atorvastatin calcium 40 mg tab p.o. daily, carbidopa-levodopa-entacapone tablet 12 point 5-50-2 100 mg tab p.o. 4 times daily, Plavix 75 mg tab p.o. daily, Dulcolax suppository 10 mg rectal suppository daily as needed, I-Lex's tablet 1 tab p.o. daily, famotidine 20 mg tab p.o. twice daily, Mag-Ox 400 mg tab 1 tab p.o. daily, milk of magnesia suspension 1200 mg per 15 mL give 30 mL p.o. daily as needed, multiple vitamin tab 1 tab p.o. daily, ropinirole 1 mg tab p.o. 4 times daily, sotalol HCl tab 80 mg tab give one half tab p.o. twice daily, vitamin C tab 500 mg tab p.o. daily, vitamin D3 tab 50 MCG tab p.o. daily. REVIEW OF SYSTEMS:  Resident denies any headache, dizziness, blurred vision. He denies any fever, chills, sore throat. He denies any rashes, bruises, or open areas. He denies any chest pain, chest discomfort, or heart palpitations.   He denies any shortness of breath or cough. He denies any nausea, vomiting, or abdominal pain. He denies any urinary urgency, frequency, or dysuria. He denies any constipation or diarrhea. He states he is eating okay, sleeping okay, and he currently has no pain. He reports he is glad he is back. PHYSICAL EXAMINATION:  Most recent vital signs: /64, temp 97 6, heart rate 66, respirations 18, pulse ox 97%, weight 133.2 pounds. Constitutional:  Resident is a 80 y.o. male alert, pleasant, and cooperative with exam.  In no apparent distress. Integument:  Pink, warm, dry. No visible rashes, bruises, or open areas. HEENT:  Normocephalic, atraumatic. External ears appear to be within normal limits. Hard of hearing conjunctivae pink. Sclerae nonicteric. Mucous membranes pink, moist.  No oropharyngeal exudate. Neck:  Supple. No cervical or clavicular lymphadenopathy. No masses noted. Cardiovascular:  Heart rate regular rate and rhythm. No murmurs, gallops, rubs noted. Respiratory:  Lung sounds Normal.  Respirations nonlabored. The patient is not using accessory muscles with breathing. Abdomen:  Soft, nontender, nondistended, normoactive bowel sounds. No masses noted. Musculoskeletal: No muscle tenderness. No joint tenderness. PV:  Peripheral pulses present. He  does not have edema to BLE. Psychological: Mood stable. Affect pleasant. Speech normal rate and tone. ASSESSMENT AND PLAN:      Diagnosis Orders   1. Parkinson disease ([address]     2. Dysphagia, unspecified type     3. Diastolic heart failure, unspecified HF chronicity ([address]     4. Sick sinus syndrome ([address]     5. Pacemaker     6. Chronic atrial fibrillation (HCC)     7. Mild protein-calorie malnutrition ([address]          1. Stable. No exacerbation of his symptoms since his return to this facility. Continue medication regimen as ordered. 2. Tolerating prescribed diet without incident.   3. No signs or symptoms of fluid overload. 4. Stable. No bradycardia, chest pain, chest discomfort. 5. Functioning within normal limits. Patient has not noted any heart palpitations or abnormal heartbeats. 6. Patient p.o. intake stable. He is being followed by hospice services. I have reviewed this resident's medication and treatment plan as well as most recent hospital records. Continue to work with hospice to meet this patient's overall comfort function safety needs. No further changes will be made at this time. Return in about 1 month (around 5/20/2022) for chronic conditions. Please note this report is partially produced by using speech recognition hardware. It may contain errors related to the system, including grammar, punctuation and spelling as well as words and phrases that may seem inaccurate. For any questions or concerns feel free to contact me for clarification        Electronically Signed By: Shima Aponte. Sandra Priest CNP on 4/28/22       ________________________    Shima Aponte.  [facility], [address]  Office [phone]  Fax [phone]

## 2022-04-30 LAB
BACTERIA: NEGATIVE /HPF
BILIRUBIN URINE: NEGATIVE
BLOOD, URINE: NEGATIVE
CLARITY: CLEAR
COLOR: ABNORMAL
EPITHELIAL CELLS, UA: NORMAL /HPF (ref 0–5)
GLUCOSE URINE: NEGATIVE MG/DL
HYALINE CASTS: NORMAL /HPF (ref 0–5)
KETONES, URINE: ABNORMAL MG/DL
LEUKOCYTE ESTERASE, URINE: ABNORMAL
NITRITE, URINE: NEGATIVE
PH UA: 5.5 (ref 5–9)
PROTEIN UA: NEGATIVE MG/DL
RBC UA: NORMAL /HPF (ref 0–5)
SPECIFIC GRAVITY UA: 1.02 (ref 1–1.03)
UROBILINOGEN, URINE: 0.2 E.U./DL
WBC UA: NORMAL /HPF (ref 0–5)

## 2022-05-02 LAB
ORGANISM: ABNORMAL
ORGANISM: ABNORMAL
URINE CULTURE, ROUTINE: ABNORMAL

## 2022-05-09 ENCOUNTER — OFFICE VISIT (OUTPATIENT)
Dept: GERIATRIC MEDICINE | Age: 87
End: 2022-05-09
Payer: MEDICARE

## 2022-05-09 DIAGNOSIS — I10 PRIMARY HYPERTENSION: ICD-10-CM

## 2022-05-09 DIAGNOSIS — K59.09 OTHER CONSTIPATION: Primary | ICD-10-CM

## 2022-05-09 DIAGNOSIS — G20 PARKINSON DISEASE (HCC): ICD-10-CM

## 2022-05-09 PROCEDURE — 1123F ACP DISCUSS/DSCN MKR DOCD: CPT | Performed by: INTERNAL MEDICINE

## 2022-05-09 PROCEDURE — 99309 SBSQ NF CARE MODERATE MDM 30: CPT | Performed by: INTERNAL MEDICINE

## 2022-05-15 NOTE — PROGRESS NOTES
Patient Name: Maxine Camarillo  YOB: 1929  Medical Record Number: [id number]      History of Present Illness:  70-year-old gentleman was reviewed to the facility recent hospitalization. Patient no change in mental status globally weak. Patient did undergo course of neurological valuation patient did undergo imaging including CT of the head. Patient did undergo valve present currently ear infection and dehydration. Patient is functionally debilitated quite weak patient has been return to the facility and functional climb under hospice services. Globally pain-free functionally weak. Review of Systems   Unable to perform ROS: Mental status change   All other systems reviewed and are negative. Review of Systems: All 14 review of systems negative other than as stated above    Social History     Tobacco Use    Smoking status: Never Smoker    Smokeless tobacco: Never Used   Substance Use Topics    Alcohol use: No    Drug use: Not on file         Past Medical History:   Diagnosis Date    Atrial fibrillation ([facility] [address]     CAD (coronary artery disease)     Carpal tunnel syndrome     Cholesterol serum increased     Diverticulosis     HISTORY OF    Epistaxis     Gout     History of prostate cancer     Hyperlipidemia     Hypertension     Parkinson disease ([facility] [address]            Past Surgical History:   Procedure Laterality Date    AORTIC VALVE SURGERY  06/08/2017    DR. LEVINE    CARDIAC CATHETERIZATION  05/08/2017    DR. MASON    COLONOSCOPY  1/30/09    DR ALVARADO    COLONOSCOPY  04/07/14      [address] [place] [address]      UPPER GASTROINTESTINAL ENDOSCOPY  1/30/09    DR Perales Serum         Current Outpatient Medications on File Prior to Visit   Medication Sig Dispense Refill    alendronate (FOSAMAX) 70 MG tablet Take 70 mg by mouth every 7 days Indications: Osteoporosis Wed      Amantadine (SYMMETREL) 100 MG TABS tablet Take 100 mg by mouth 2 times daily Indications: Parkinson's Disease      ammonium lactate (AMLACTIN) 12 % cream Apply topically in the morning and at bedtime Indications: Dryness Confined to a Specific area of the Body      aspirin 81 MG EC tablet Take 81 mg by mouth daily Indications: Preventative Treatment      atorvastatin (LIPITOR) 40 MG tablet Take 40 mg by mouth daily Indications: High Amount of Fats in the Blood      carbidopa-levodopa-entacapone (STALEVO 50) 12.5- MG TABS per tablet Take 1 tablet by mouth 4 times daily Indications: Parkinson's Disease      clopidogrel (PLAVIX) 75 MG tablet Take 75 mg by mouth daily Indications: Atrial Fibrillation      Multiple Vitamins-Minerals (EYE-VITES) TABS Take 1 tablet by mouth daily Indications: Nutritional Support      famotidine (PEPCID) 20 MG tablet Take 20 mg by mouth 2 times daily Indications: Nutritional Support      magnesium oxide (MAG-OX) 400 MG tablet Take 400 mg by mouth daily Indications: Nutritional Support      Multiple Vitamin (MULTIVITAMIN ADULT) TABS Take 1 tablet by mouth daily Indications: Nutritional Support      ascorbic acid (VITAMIN C) 500 MG tablet Take 500 mg by mouth daily Indications: Nutritional Support       sotalol (BETAPACE) 80 MG tablet Take 40 mg by mouth 2 times daily Indications: Atrial Fibrillation, High Blood Pressure Disorder       rOPINIRole (REQUIP) 1 MG tablet Take 1 mg by mouth 4 times daily Indications: Restless Leg Syndrome       Cholecalciferol (VITAMIN D3) 50 MCG (2000 UT) TABS Take 2,000 Units by mouth daily Indications: Nutritional Support       [DISCONTINUED] darifenacin (ENABLEX) 15 MG SR tablet Take 1 tablet by mouth daily. 90 tablet 3     Current Facility-Administered Medications on File Prior to Visit   Medication Dose Route Frequency Provider Last Rate Last Admin    triamcinolone acetonide (KENALOG-40) injection 80 mg  80 mg IntraMUSCular Once Em Paniagua MD           No Known Allergies      No family history on file.       Physical Exam: Physical Exam  Vitals and nursing note reviewed. Constitutional:       Appearance: Normal appearance. HENT:      Head: Normocephalic and atraumatic. Mouth/Throat:      Mouth: Mucous membranes are dry. Eyes:      Extraocular Movements: Extraocular movements intact. Cardiovascular:      Rate and Rhythm: Normal rate. Rhythm irregular. Heart sounds: Normal heart sounds. Pulmonary:      Breath sounds: Normal breath sounds. No wheezing. Abdominal:      General: Bowel sounds are normal.      Palpations: Abdomen is soft. Musculoskeletal:         General: Swelling present. No tenderness. Cervical back: Neck supple. No tenderness. Comments: Limited range of motion     Skin:     General: Skin is dry. Findings: Bruising present. Neurological:      Mental Status: He is disoriented. Motor: Weakness present. Gait: Gait abnormal.   Psychiatric:         Mood and Affect: Mood normal.         There were no vitals taken for this visit.       .   Lab Results   Component Value Date    WBC 9.3 04/06/2022    HGB 11.9 (L) 04/06/2022    HCT 38.2 (L) 04/06/2022    MCV 94 04/06/2022     04/06/2022     Lab Results   Component Value Date     04/06/2022    K 3.7 04/06/2022    CL 99 04/06/2022    CO2 29 03/23/2022    BUN 12 03/23/2022    CREATININE 0.57 04/06/2022    GLUCOSE 121 04/06/2022    CALCIUM 9.2 04/06/2022                ASSESSMENT:  Patient Active Problem List   Diagnosis    Carpal tunnel syndrome    Atrial fibrillation    Hyperlipidemia    Parkinson disease ([facility] [address]    Coronary artery disease involving native heart    Primary hypertension    Idiopathic chronic gout of multiple sites without tophus    Mixed hyperlipidemia    Constipation    Astigmatism    Vitreous degeneration    Dry eye syndrome    Subjective tinnitus    Deafness, sensorineural    Basal cell papilloma    Error, refractive, myopia    Artificial lens present    Inflamed seborrheic keratosis    Hypertropia    Binocular vision disorder with diplopia    Chronic rhinitis    Presence of cardiac pacemaker    Pacemaker    Hip pain    COVID    Mild protein-calorie malnutrition (HCC)    Muscle weakness    Dysphagia    Sick sinus syndrome (HCC)    Diastolic heart failure (HCC)         PLAN:   Diagnosis Orders   1. Parkinson disease ([facility] [address]     2. Chronic atrial fibrillation (HCC)     3. Muscle weakness     4. Primary hypertension       Continue supportive care through hospice services referral.  Monitor heart rate continue current Parkinson's medications weaning as able. Reduce medication burden continue skin surveillance nutritional support. Ongoing falls precautions ongoing reevaluation for delirium. Please note orders entered on site at facility after discussion with appropriate facility nursing/therapy/ / nutritional staff. Current longstanding medical problems and acute medical issues addressed with staff. Available data and data elements in on site paper chart reviewed and analyzed. Current external consultant notes reviewed in on site chart. Ordered laboratory testing and imaging will be reviewed when available. This patient's need for psychiatric medication has been reviewed. Will consider further adjustment and possible further evaluations by mental health services.

## 2022-06-07 NOTE — PROGRESS NOTES
SUBJECTIVE:  This patient seen for follow-up visit for heart failure constipation parkinsonism hypertension. Patient is functionally stable patient has a history of A. fib without evidence of new RVR no bleeding diathesis. Patient has been on weak and has been on Sinemet tolerating well notes acute functional plan terms of heart failure no recent chest palpitation or change in her his bowel bladder habits no acute psychosis. ROS: Limited by cognition  The rest of the 14 point ROS negative    PHYSICAL EXAM: VSS per facility record  Masklike facies pupils are small oral mucosa moist no thrush neck was supple chest showed no crackles no wheezing cardiovascular showed a regular rate few ectopic beats abdomen soft nontender EXTR trace peripheral pulse skin shows no rash    ASSESSMENT & PLAN:   Diagnosis Orders   1. Other constipation     2. Primary hypertension     3. Parkinson disease ([address]         3 Continue Sinemet continue past range of motion. Continue current bowel regimen. Continue that and they hypertensive regimen following closely. Past Medical History:   Diagnosis Date    Atrial fibrillation ([address]     CAD (coronary artery disease)     Carpal tunnel syndrome     Cholesterol serum increased     Diverticulosis     HISTORY OF    Epistaxis     Gout     History of prostate cancer     Hyperlipidemia     Hypertension     Parkinson disease ([address]          Past Surgical History:   Procedure Laterality Date    AORTIC VALVE SURGERY  06/08/2017    DR. LEVINE    CARDIAC CATHETERIZATION  05/08/2017    DR. MASON    COLONOSCOPY  1/30/09    DR ALVARADO    COLONOSCOPY  04/07/14    DR. G[facility]a [facility]      UPPER GASTROINTESTINAL ENDOSCOPY  1/30/09    DR Soni Nine         Current Outpatient Medications on File Prior to Visit   Medication Sig Dispense Refill    alendronate (FOSAMAX) 70 MG tablet Take 70 mg by mouth every 7 days Indications: Osteoporosis Wed      Amantadine (SYMMETREL) 100 MG TABS tablet Take 100 mg by mouth 2 times daily Indications: Parkinson's Disease      ammonium lactate (AMLACTIN) 12 % cream Apply topically in the morning and at bedtime Indications: Dryness Confined to a Specific area of the Body      aspirin 81 MG EC tablet Take 81 mg by mouth daily Indications: Preventative Treatment      atorvastatin (LIPITOR) 40 MG tablet Take 40 mg by mouth daily Indications: High Amount of Fats in the Blood      carbidopa-levodopa-entacapone (STALEVO 50) 12.5- MG TABS per tablet Take 1 tablet by mouth 4 times daily Indications: Parkinson's Disease      clopidogrel (PLAVIX) 75 MG tablet Take 75 mg by mouth daily Indications: Atrial Fibrillation      Multiple Vitamins-Minerals (EYE-VITES) TABS Take 1 tablet by mouth daily Indications: Nutritional Support      famotidine (PEPCID) 20 MG tablet Take 20 mg by mouth 2 times daily Indications: Nutritional Support      magnesium oxide (MAG-OX) 400 MG tablet Take 400 mg by mouth daily Indications: Nutritional Support      Multiple Vitamin (MULTIVITAMIN ADULT) TABS Take 1 tablet by mouth daily Indications: Nutritional Support      ascorbic acid (VITAMIN C) 500 MG tablet Take 500 mg by mouth daily Indications: Nutritional Support       sotalol (BETAPACE) 80 MG tablet Take 40 mg by mouth 2 times daily Indications: Atrial Fibrillation, High Blood Pressure Disorder       rOPINIRole (REQUIP) 1 MG tablet Take 1 mg by mouth 4 times daily Indications: Restless Leg Syndrome       Cholecalciferol (VITAMIN D3) 50 MCG (2000 UT) TABS Take 2,000 Units by mouth daily Indications: Nutritional Support       [DISCONTINUED] darifenacin (ENABLEX) 15 MG SR tablet Take 1 tablet by mouth daily.  90 tablet 3     Current Facility-Administered Medications on File Prior to Visit   Medication Dose Route Frequency Provider Last Rate Last Admin    triamcinolone acetonide (KENALOG-40) injection 80 mg  80 mg IntraMUSCular Once [employer], MD             No family history on file. Social History     Socioeconomic History    Marital status:      Spouse name: Not on file    Number of children: Not on file    Years of education: Not on file    Highest education level: Not on file   Occupational History    Not on file   Tobacco Use    Smoking status: Never Smoker    Smokeless tobacco: Never Used   Substance and Sexual Activity    Alcohol use: No    Drug use: Not on file    Sexual activity: Not on file   Other Topics Concern    Not on file   Social History Narrative    Not on file     Social Determinants of Health     Financial Resource Strain:     Difficulty of Paying Living Expenses: Not on file   Food Insecurity:     Worried About Running Out of Food in the Last Year: Not on file    Bon of Food in the Last Year: Not on file   Transportation Needs:     Lack of Transportation (Medical): Not on file    Lack of Transportation (Non-Medical):  Not on file   Physical Activity:     Days of Exercise per Week: Not on file    Minutes of Exercise per Session: Not on file   Stress:     Feeling of Stress : Not on file   Social Connections:     Frequency of Communication with Friends and Family: Not on file    Frequency of Social Gatherings with Friends and Family: Not on file    Attends [employer] Services: Not on file    Active Member of [employer] or Organizations: Not on file    Attends Club or Organization Meetings: Not on file    Marital Status: Not on file   Intimate Partner Violence:     Fear of Current or Ex-Partner: Not on file    Emotionally Abused: Not on file    Physically Abused: Not on file    Sexually Abused: Not on file   Housing Stability:     Unable to Pay for Housing in the Last Year: Not on file    Number of [place] in the Last Year: Not on file    Unstable Housing in the Last Year: Not on file         Lab Results   Component Value Date    LABA1C 6.0 (H) 04/22/2021     No results found for: EAG    Lab Results   Component Value Date     04/06/2022    K 3.7 04/06/2022    CL 99 04/06/2022    CO2 29 03/23/2022    BUN 12 03/23/2022    CREATININE 0.57 04/06/2022    GLUCOSE 121 04/06/2022    CALCIUM 9.2 04/06/2022        Lab Results   Component Value Date    CHOL 140 04/22/2021    CHOL 138 05/22/2018    CHOL 183 03/30/2018     Lab Results   Component Value Date    TRIG 72 04/22/2021    TRIG 174 (A) 05/22/2018    TRIG 111 03/30/2018     Lab Results   Component Value Date    HDL 47 04/22/2021    HDL 41 05/22/2018    HDL 48 03/30/2018     Lab Results   Component Value Date    LDLCALC 79 04/22/2021    LDLCALC 62 05/22/2018    LDLCALC 113 03/30/2018     Lab Results   Component Value Date    LABVLDL 35 (A) 05/22/2018    LABVLDL 22 03/30/2018     Lab Results   Component Value Date    CHOLHDLRATIO 3.4 05/22/2018    CHOLHDLRATIO 3.8 03/30/2018    CHOLHDLRATIO 3.8 02/21/2012       Lab Results   Component Value Date    TSH 2.44 03/31/2022       Lab Results   Component Value Date    WBC 9.3 04/06/2022    HGB 11.9 (L) 04/06/2022    HCT 38.2 (L) 04/06/2022    MCV 94 04/06/2022     04/06/2022       Please note orders entered on site at facility after discussion with appropriate facility nursing/therapy/ / nutritional staff. Current longstanding medical problems and acute medical issues addressed with staff. Available data and data elements in on site paper chart reviewed and analyzed. Current external consultant notes reviewed in on site chart. Ordered laboratory testing and imaging will be reviewed when available.